# Patient Record
Sex: MALE | Race: WHITE | NOT HISPANIC OR LATINO | Employment: FULL TIME | URBAN - METROPOLITAN AREA
[De-identification: names, ages, dates, MRNs, and addresses within clinical notes are randomized per-mention and may not be internally consistent; named-entity substitution may affect disease eponyms.]

---

## 2019-08-20 ENCOUNTER — OFFICE VISIT (OUTPATIENT)
Dept: PAIN MEDICINE | Facility: CLINIC | Age: 49
End: 2019-08-20

## 2019-08-20 VITALS
RESPIRATION RATE: 18 BRPM | OXYGEN SATURATION: 98 % | HEART RATE: 68 BPM | SYSTOLIC BLOOD PRESSURE: 131 MMHG | HEIGHT: 70 IN | TEMPERATURE: 98.1 F | BODY MASS INDEX: 32.3 KG/M2 | WEIGHT: 225.6 LBS | DIASTOLIC BLOOD PRESSURE: 81 MMHG

## 2019-08-20 DIAGNOSIS — M47.816 LUMBAR FACET ARTHROPATHY: ICD-10-CM

## 2019-08-20 DIAGNOSIS — G89.29 OTHER CHRONIC PAIN: Primary | ICD-10-CM

## 2019-08-20 LAB
POC AMPHETAMINES: NEGATIVE
POC BARBITURATES: NEGATIVE
POC BENZODIAZEPHINES: NEGATIVE
POC COCAINE: NEGATIVE
POC METHADONE: NEGATIVE
POC METHAMPHETAMINE SCREEN URINE: NEGATIVE
POC OPIATES: NEGATIVE
POC OXYCODONE: NEGATIVE
POC PHENCYCLIDINE: NEGATIVE
POC PROPOXYPHENE: NEGATIVE
POC THC: NEGATIVE
POC TRICYCLIC ANTIDEPRESSANTS: NEGATIVE

## 2019-08-20 PROCEDURE — 80305 DRUG TEST PRSMV DIR OPT OBS: CPT | Performed by: NURSE PRACTITIONER

## 2019-08-20 PROCEDURE — 99204 OFFICE O/P NEW MOD 45 MIN: CPT | Performed by: NURSE PRACTITIONER

## 2019-08-20 RX ORDER — FLUTICASONE PROPIONATE 50 MCG
SPRAY, SUSPENSION (ML) NASAL
COMMUNITY
End: 2022-08-24

## 2019-08-20 RX ORDER — MOMETASONE FUROATE 1 MG/G
OINTMENT TOPICAL DAILY
COMMUNITY
Start: 2017-11-29 | End: 2022-08-24

## 2019-08-20 RX ORDER — AZELASTINE HCL 205.5 UG/1
SPRAY NASAL
COMMUNITY
Start: 2019-08-17 | End: 2022-08-24

## 2019-08-20 RX ORDER — LOVASTATIN 40 MG/1
40 TABLET ORAL DAILY
COMMUNITY
Start: 2018-12-11 | End: 2019-12-11

## 2019-08-20 RX ORDER — LORATADINE 10 MG/1
10 TABLET ORAL DAILY
COMMUNITY
Start: 2018-01-25 | End: 2022-08-24

## 2019-08-20 RX ORDER — MELOXICAM 15 MG/1
15 TABLET ORAL DAILY
Qty: 30 TABLET | Refills: 1 | Status: SHIPPED | OUTPATIENT
Start: 2019-08-20 | End: 2019-10-15 | Stop reason: SDUPTHER

## 2019-08-20 NOTE — PROGRESS NOTES
CHIEF COMPLAINT  Mr. Martinez states that his low back pain started about 8 months ago and has progressively gotten worse. He states that his pain is worse at night and when he lays flat on his back.    Subjective   Aldair Martinez is a 48 y.o. male.   He presents to the office for evaluation of back pain. He was referred here by STEVO Keenan (pcp).     Reports back pain started about 8 months ago. Pain has become progressively worse since onset.  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 Recent MRI. Has been referred here to consider interventional options.      Pain is present across the low back.  The pain does not radiate.  Worse with prolonged sitting, laying flat at night.  Improved with nothing in particular. Sleeps in a recliner which helps.  Has not been taking anything OTC.  Tried Gabapentin prescribed by PCP but says they did not help, only tried for 1.5 weeks.      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  Lives at home with his wife.  Denies use of tobacco, alcohol, or drugs. No history of substance abuse.      Back Pain   This is a chronic problem. The current episode started more than 1 year ago. The problem occurs daily. The problem has been gradually worsening since onset. The pain is present in the lumbar spine. The quality of the pain is described as aching and burning. The pain does not radiate. The pain is at a severity of 6/10. The pain is moderate. The symptoms are aggravated by position, sitting and lying down. Pertinent negatives include no numbness or weakness. He has tried home exercises (Gabapentin) for the symptoms. The treatment provided no relief.     PEG Assessment   What number best describes your pain on average in the past week?8  What number best describes how, during the past week, pain has interfered with your enjoyment of life?6  What number best describes how, during the past week, pain has  "interfered with your general activity?  8      Current Outpatient Medications:   •  azelastine (ASTEPRO) 0.15 % solution nasal spray, , Disp: , Rfl:   •  fluticasone (FLONASE) 50 MCG/ACT nasal spray, into the nostril(s) as directed by provider., Disp: , Rfl:   •  loratadine (CLARITIN) 10 MG tablet, Take 10 mg by mouth Daily., Disp: , Rfl:   •  lovastatin (MEVACOR) 40 MG tablet, Take 40 mg by mouth Daily., Disp: , Rfl:   •  mometasone (ELOCON) 0.1 % ointment, Apply  topically to the appropriate area as directed Daily., Disp: , Rfl:   •  meloxicam (MOBIC) 15 MG tablet, Take 1 tablet by mouth Daily., Disp: 30 tablet, Rfl: 1    The following portions of the patient's history were reviewed and updated as appropriate: allergies, current medications, past family history, past medical history, past social history, past surgical history and problem list.    REVIEW OF PERTINENT MEDICAL DATA    Patient was referred to our practice by his primary care provider Purnima WHITESIDE.  I reviewed office note dated 7/3/2019.  Patient complaining of lumbar spine pain.  Pain severity is 8 out of 10.  Ordered an MRI of the lumbar spine.  Gabapentin.  Referral to pain medicine to consider injections.        WEST = 6    Review of Systems   Constitutional: Negative for fatigue.   HENT: Negative for congestion.    Eyes: Negative for visual disturbance.   Respiratory: Negative for cough, shortness of breath and wheezing.    Cardiovascular: Negative.    Gastrointestinal: Negative for constipation and diarrhea.   Genitourinary: Negative for difficulty urinating.   Musculoskeletal: Positive for back pain.   Neurological: Negative for weakness and numbness.   Psychiatric/Behavioral: Positive for sleep disturbance. Negative for suicidal ideas. The patient is not nervous/anxious.      Vitals:    08/20/19 1042   BP: 131/81   Pulse: 68   Resp: 18   Temp: 98.1 °F (36.7 °C)   SpO2: 98%   Weight: 102 kg (225 lb 9.6 oz)   Height: 177.8 cm (70\") "   PainSc:   6   PainLoc: Back     Objective   Physical Exam   Constitutional: He is oriented to person, place, and time. He appears well-developed and well-nourished. He is cooperative. No distress.   HENT:   Head: Normocephalic and atraumatic.   Right Ear: External ear normal.   Left Ear: External ear normal.   Nose: Nose normal.   Eyes: Conjunctivae, EOM and lids are normal. Pupils are equal, round, and reactive to light.   Neck: Trachea normal and normal range of motion. Neck supple.   Cardiovascular: Normal rate, regular rhythm and normal heart sounds.   Pulmonary/Chest: Effort normal and breath sounds normal. No respiratory distress.   Abdominal: Soft. Normal appearance and bowel sounds are normal. He exhibits no distension. There is no tenderness. There is no rebound.   Musculoskeletal: He exhibits no deformity.        Lumbar back: He exhibits decreased range of motion, tenderness, bony tenderness and pain.   +lumbar facet tenderness/loading   Neurological: He is alert and oriented to person, place, and time. He has normal strength and normal reflexes. No cranial nerve deficit or sensory deficit. Coordination and gait normal.   Reflex Scores:       Patellar reflexes are 2+ on the right side and 2+ on the left side.       Achilles reflexes are 2+ on the right side and 2+ on the left side.  Skin: Skin is warm, dry and intact. He is not diaphoretic.   Psychiatric: He has a normal mood and affect. His speech is normal and behavior is normal. Judgment and thought content normal. Cognition and memory are normal.   Nursing note and vitals reviewed.    Assessment/Plan   Aldair was seen today for back pain.    Diagnoses and all orders for this visit:    Other chronic pain    Lumbar facet arthropathy  -     Case Request  -     Urine Drug Screen Confirmation - Urine, Clean Catch; Future  -     POC Urine Drug Screen, Triage    Other orders  -     meloxicam (MOBIC) 15 MG tablet; Take 1 tablet by mouth Daily.      ---  "Bilateral L3-L5 MBB X 2, 2-4 weeks apart.   -------  Education about Medial Branch Blockade and RF Therapy:    This medial branch blockade (MBB) suggested is intended for diagnostic purposes, with the intent of offering the patient Radiofrequency thermal rhizotomy (RF) if the MBB is diagnostically effective.  The diagnostic blockade is necessary to determine the likelihood that RF therapy could be efficacious in providing long term relief to the patient.    Medial branches are sensory nerve branches that connect to a facet joint and transmit sensations & pain signals from that joint.  Facet is a term for the type of joints found in the spine.  Medial branches are the nerves that go to a facet, and therefore are also sometimes called \"facet joint nerves\" (FJNs).      In a medial branch blockade procedure, xray fluoroscopy is used to verify the locations of the outside of the joint lines which are being targeted.  Under xray guidance, needles are placed to these areas.  Contrast dye is injected to confirm proper placement, with dye flowing over the joint area, and to ensure that the dye does not flow into unintended areas such as a vein.  When this is confirmed, local anesthetic is injected to block the medial branch at that joint level.      If MBBs are diagnostically successful in blocking pain, then the patient is most likely a great candidate for Radiofrequency of those facet joint nerves.  In the RF procedure, needles are placed to the joint lines in the same fashion, and after testing, the needle tips are heated to thermally treat the nerves, blocking the nerves by in essence damaging the nerves with the heat treatment.       Medically, a successful RF procedure should provide a patient with 50% pain relief or more for at least 6 months.  Clinical experience suggests that successful patients receive relief more in the range of 12 months on average.  We also discussed that a fortunate minority of patients receive " therapeutic success from the MBB, and may not require RF ablation.  If a patient receives more than 8 weeks of relief from MBB, then occasional repeat MBB for therapeutic purposes is a very reasonable alternative therapy.  This course of therapy is consistent with our LCDs according to our CMS  in the area, and therefore other insurance providers should follow accordingly.  We will monitor our patients to screen for these therapeutic responders and will offer RF therapy only when necessary.      We discussed that MBB & RF are not without risks.  Guidelines regarding anticoagulant use & neuraxial procedures will be respected.  Patients that are ill or otherwise may be at risk for sepsis will not have their spines accessed by neuraxial injections of any type.  This patient will not be offered these therapies if there is an increased risk.   We discussed that there is a risk of postprocedural pain and also a risk of worsening of clinical picture with these procedures as with any neuraxial procedure.    -------    --- Trial Meloxicam 15 mg daily. Discussed medication with the patient.  Included in this discussion was the potential for side effects and adverse events.  Patient verbalized understanding and wished to proceed.  Prescription will be sent to pharmacy.  --- Routine UDS in office today as part of new patient evaluation.  The specimen was viewed and the immunoassay result reviewed and is NEG.  This specimen will be sent to [facility] for confirmation.     --- Follow-up after procedure

## 2019-08-25 ENCOUNTER — RESULTS ENCOUNTER (OUTPATIENT)
Dept: PAIN MEDICINE | Facility: CLINIC | Age: 49
End: 2019-08-25

## 2019-08-25 DIAGNOSIS — M47.816 LUMBAR FACET ARTHROPATHY: ICD-10-CM

## 2019-09-05 DIAGNOSIS — M47.816 LUMBAR FACET ARTHROPATHY: Primary | ICD-10-CM

## 2019-10-15 RX ORDER — MELOXICAM 15 MG/1
15 TABLET ORAL DAILY
Qty: 30 TABLET | Refills: 1 | Status: SHIPPED | OUTPATIENT
Start: 2019-10-15 | End: 2019-11-08 | Stop reason: ALTCHOICE

## 2019-10-15 NOTE — TELEPHONE ENCOUNTER
I can try to order this, however more than likely insurance will again deny it since we will not have updated documentation that he has completed physical therapy. I am happy to do this, but again it may end up with an office visit in the end...

## 2019-10-15 NOTE — TELEPHONE ENCOUNTER
Patient called and stated that he has gone to PT and it is not improving his pain. He would liek to know if you can order the procedure now does he have to come in for a f/u. He stated that e has a $50 co-pay. Please advise.

## 2019-11-08 ENCOUNTER — OFFICE VISIT (OUTPATIENT)
Dept: PAIN MEDICINE | Facility: CLINIC | Age: 49
End: 2019-11-08

## 2019-11-08 ENCOUNTER — PRIOR AUTHORIZATION (OUTPATIENT)
Dept: PAIN MEDICINE | Facility: CLINIC | Age: 49
End: 2019-11-08

## 2019-11-08 VITALS
DIASTOLIC BLOOD PRESSURE: 83 MMHG | BODY MASS INDEX: 33.41 KG/M2 | WEIGHT: 233.4 LBS | HEART RATE: 67 BPM | HEIGHT: 70 IN | OXYGEN SATURATION: 97 % | SYSTOLIC BLOOD PRESSURE: 131 MMHG | TEMPERATURE: 97.1 F | RESPIRATION RATE: 16 BRPM

## 2019-11-08 DIAGNOSIS — G89.29 OTHER CHRONIC PAIN: Primary | ICD-10-CM

## 2019-11-08 DIAGNOSIS — M47.816 LUMBAR FACET ARTHROPATHY: ICD-10-CM

## 2019-11-08 PROCEDURE — 99214 OFFICE O/P EST MOD 30 MIN: CPT | Performed by: NURSE PRACTITIONER

## 2019-11-08 RX ORDER — DICLOFENAC 35 MG/1
1 CAPSULE ORAL EVERY 8 HOURS PRN
Qty: 90 CAPSULE | Refills: 0 | Status: SHIPPED | OUTPATIENT
Start: 2019-11-08 | End: 2020-04-15 | Stop reason: SINTOL

## 2019-11-08 NOTE — TELEPHONE ENCOUNTER
JEFFREY BARTLETT (Key: [id number]) – [id number]  Zorvolex 35MG capsules  Status: Approved  Created: November 8th, 2019 [id number]  Sent: November 8th, 2019

## 2019-11-08 NOTE — PROGRESS NOTES
CHIEF COMPLAINT  F/U back pain- patient states that his pain has remained the same since his last visit. He states that he went to PT and that actually made his pain worse. He states that the Meloxicam improves his pain enough so he can sleep in his bed and not in a chair.     Subjective   Aldair Martinez is a 48 y.o. male  who presents to the office for follow-up.He has chronic back pain (unstable).    Patient reports that his back pain started 10 or 11 months ago.  Pain has become aggressively worse since the onset.  Has recently completed formal physical therapy which actually made his pain worse.  Has also failed conservative options with medications including over-the-counter NSAIDs, over-the-counter Tylenol, gabapentin.  Meloxicam has helped some but still reports significant levels of pain.      Pain is axial and present across the low back.  Denies any radiating symptoms.  Worse with prolonged sitting and standing. Meloxicam helps some, pain still severe.     MRI        Back Pain    This is a chronic problem. The current episode started more than 1 year ago. The problem occurs daily. The problem has been gradually worsening since onset. The pain is present in the lumbar spine. The quality of the pain is described as aching and burning. The pain does not radiate. The pain is at a severity of 6/10. The pain is moderate. The symptoms are aggravated by position, sitting and lying down. Pertinent negatives include no abdominal pain, dysuria, headaches, numbness or weakness. He has tried home exercises (Gabapentin, Meloxicam, PT) for the symptoms. The treatment provided mild relief.     PEG Assessment   What number best describes your pain on average in the past week?7  What number best describes how, during the past week, pain has interfered with your enjoyment of life?5  What number best describes how, during the past week, pain has interfered with your general activity?  5    The following portions of the  "patient's history were reviewed and updated as appropriate: allergies, current medications, past family history, past medical history, past social history, past surgical history and problem list.    Review of Systems   Constitutional: Negative for fatigue.   HENT: Negative for congestion.    Eyes: Negative for visual disturbance.   Respiratory: Positive for shortness of breath. Negative for cough and wheezing.    Cardiovascular: Negative.    Gastrointestinal: Negative for abdominal pain, constipation and diarrhea.   Genitourinary: Negative for difficulty urinating and dysuria.   Musculoskeletal: Positive for back pain.   Neurological: Negative for dizziness, weakness, light-headedness, numbness and headaches.   Psychiatric/Behavioral: Positive for sleep disturbance. Negative for agitation and suicidal ideas. The patient is not nervous/anxious.      Vitals:    11/08/19 1121   BP: 131/83   Pulse: 67   Resp: 16   Temp: 97.1 °F (36.2 °C)   SpO2: 97%   Weight: 106 kg (233 lb 6.4 oz)   Height: 177.8 cm (70\")   PainSc:   6   PainLoc: Back     Objective   Physical Exam   Constitutional: He is oriented to person, place, and time. He appears well-developed and well-nourished. No distress.   HENT:   Head: Normocephalic and atraumatic.   Eyes: Conjunctivae and EOM are normal.   Neck: Neck supple.   Cardiovascular: Normal rate.   Pulmonary/Chest: Effort normal. No respiratory distress.   Musculoskeletal:        Lumbar back: He exhibits tenderness and pain.   +lumbar facet tenderness/loading    Neurological: He is alert and oriented to person, place, and time. He has normal strength. No sensory deficit. Gait normal.   Skin: Skin is warm and dry. He is not diaphoretic.   Psychiatric: He has a normal mood and affect. His behavior is normal.   Nursing note and vitals reviewed.    Assessment/Plan   Aldair was seen today for back pain.    Diagnoses and all orders for this visit:    Other chronic pain    Lumbar facet arthropathy  -     " "Case Request    Other orders  -     Diclofenac (ZORVOLEX) 35 MG capsule; Take 1 capsule by mouth Every 8 (Eight) Hours As Needed (pain).      --- Bilateral L3-L5 MBB X 2, 2-4 weeks apart.   -------  Education about Medial Branch Blockade and RF Therapy:    This medial branch blockade (MBB) suggested is intended for diagnostic purposes, with the intent of offering the patient Radiofrequency thermal rhizotomy (RF) if the MBB is diagnostically effective.  The diagnostic blockade is necessary to determine the likelihood that RF therapy could be efficacious in providing long term relief to the patient.    Medial branches are sensory nerve branches that connect to a facet joint and transmit sensations & pain signals from that joint.  Facet is a term for the type of joints found in the spine.  Medial branches are the nerves that go to a facet, and therefore are also sometimes called \"facet joint nerves\" (FJNs).      In a medial branch blockade procedure, xray fluoroscopy is used to verify the locations of the outside of the joint lines which are being targeted.  Under xray guidance, needles are placed to these areas.  Contrast dye is injected to confirm proper placement, with dye flowing over the joint area, and to ensure that the dye does not flow into unintended areas such as a vein.  When this is confirmed, local anesthetic is injected to block the medial branch at that joint level.      If MBBs are diagnostically successful in blocking pain, then the patient is most likely a great candidate for Radiofrequency of those facet joint nerves.  In the RF procedure, needles are placed to the joint lines in the same fashion, and after testing, the needle tips are heated to thermally treat the nerves, blocking the nerves by in essence damaging the nerves with the heat treatment.       Medically, a successful RF procedure should provide a patient with 50% pain relief or more for at least 6 months.  Clinical experience suggests " that successful patients receive relief more in the range of 12 months on average.  We also discussed that a fortunate minority of patients receive therapeutic success from the MBB, and may not require RF ablation.  If a patient receives more than 8 weeks of relief from MBB, then occasional repeat MBB for therapeutic purposes is a very reasonable alternative therapy.  This course of therapy is consistent with our LCDs according to our CMS  in the area, and therefore other insurance providers should follow accordingly.  We will monitor our patients to screen for these therapeutic responders and will offer RF therapy only when necessary.      We discussed that MBB & RF are not without risks.  Guidelines regarding anticoagulant use & neuraxial procedures will be respected.  Patients that are ill or otherwise may be at risk for sepsis will not have their spines accessed by neuraxial injections of any type.  This patient will not be offered these therapies if there is an increased risk.   We discussed that there is a risk of postprocedural pain and also a risk of worsening of clinical picture with these procedures as with any neuraxial procedure.    -------    --- Trial a different NSAID - trial zorvolex. Discussed medication with the patient.  Included in this discussion was the potential for side effects and adverse events.  Patient verbalized understanding and wished to proceed.  Prescription will be sent to pharmacy.  --- Follow-up after procedure          RAMY REPORT  RAMY report has been reviewed and scanned into the patient's chart.    As the clinician, I personally reviewed the RAMY from 11/8/19 while the patient was in the office today.    EMR Dragon/Transcription disclaimer:   Much of this encounter note is an electronic transcription/translation of spoken language to printed text. The electronic translation of spoken language may permit erroneous, or at times, nonsensical words or phrases to  be inadvertently transcribed; Although I have reviewed the note for such errors, some may still exist.

## 2019-12-12 ENCOUNTER — OUTSIDE FACILITY SERVICE (OUTPATIENT)
Dept: PAIN MEDICINE | Facility: CLINIC | Age: 49
End: 2019-12-12

## 2019-12-12 ENCOUNTER — DOCUMENTATION (OUTPATIENT)
Dept: PAIN MEDICINE | Facility: CLINIC | Age: 49
End: 2019-12-12

## 2019-12-12 PROCEDURE — 64494 INJ PARAVERT F JNT L/S 2 LEV: CPT | Performed by: ANESTHESIOLOGY

## 2019-12-12 PROCEDURE — 64493 INJ PARAVERT F JNT L/S 1 LEV: CPT | Performed by: ANESTHESIOLOGY

## 2019-12-16 NOTE — PROGRESS NOTES
Bilateral L3-5 Lumbar Medial Branch Blockade  [facility]      PREOPERATIVE DIAGNOSIS:  Lumbar spondylosis without myelopathy    POSTOPERATIVE DIAGNOSIS:  Lumbar spondylosis without myelopathy    PROCEDURE:   Diagnostic Bilateral Lumbar Medial Branch Nerve Blockades, with fluoroscopy:  L3, L4, and L5 nerves (at the L4 & L5 transverse processes and the sacral alar groove) to block facet joints L4-5, and L5-S1  1. 03717-67 -- Bilateral Lumbar Facet blocks, 1st Level  2. 45138-56 -- Bilateral Lumbar Facet blocks, 2nd  Level    PRE-PROCEDURE DISCUSSION WITH PATIENT:    Risks and complications were discussed with the patient prior to starting the procedure and informed consent was obtained.      SURGEON:  Onesimo Patino MD    REASON FOR PROCEDURE:    The patient complains of pain that seems to have a significant axial component    SEDATION:  Patient declined administration of moderate sedation    ANESTHETIC:  Marcaine 0.5%  STEROID:  Methylprednisolone (DEPO MEDROL) 60mg  TOTAL VOLUME OF SOLUTION:  6ml    DESCRIPTON OF PROCEDURE:  After obtaining informed consent, IV access was not obtained in the preoperative area.   The patient was taken to the operating room.  The patient was placed in the prone position with a pillow under the abdomen. All pressure points were well padded.  EKG, blood pressure, and pulse oximeter were monitored.  The patient was monitored and sedated by the RN under my direction. The lumbosacral area was prepped with Chloraprep and draped in a sterile fashion. Under fluoroscopic guidance the transverse processes of the L4 and L5 vertebrae at the junctions of the superior articular processes were identified on the right. Also identified was the groove between the ala and the superior articular process of the sacrum on the ipsilateral side.  Skin and subcutaneous tissue were anesthetized with 1% lidocaine above each of these points. A 22-gauge spinal needle was introduced under  fluoroscopic guidance at the above junctions. Aspiration was negative for blood and CSF.  After confirming the position of the needle with fluoroscope in all views, 0.25 mL of Omnipaque was injected, and after seeing the proper spread a total of 1 mL of the anesthetic solution noted above was injected at each of these points.  Needles were removed intact from each of the areas.  A similar procedure was repeated to block the L3, L4, and L5 nerves on the contralateral side.   Onset of analgesia was noted.  Vital signs remained stable throughout.      ESTIMATED BLOOD LOSS:  <5 mL  SPECIMENS:  none    COMPLICATIONS:   No complications were noted., There was no indication of vascular uptake on live injection of contrast dye. and The patient did not have any signs of postprocedure numbness nor weakness.    TOLERANCE & DISCHARGE CONDITION:    The patient tolerated the procedure well.  The patient was transported to the recovery area without difficulties.  The patient was discharged to home under the care of family in stable and satisfactory condition.    PLAN OF CARE:  1. The patient was given our standard instruction sheet.  2. We discussed that Lumbar Medial Branch Blockade is a diagnostic procedure in consideration for radiofrequency ablation if two diagnostic procedures prove to be positive for significant benefit.  If sustained relief of 6 to eight weeks is obtained, then an alternative plan could be therapeutic lumbar branch blockades.  3. The patient is asked to keep a pain log each hour for 8 hours after the procedure today.  4. The patient will  Repeat injection 2-4 wks.  5. The patient will resume all medications as per the medication reconciliation sheet.

## 2020-01-08 RX ORDER — MELOXICAM 15 MG/1
TABLET ORAL
COMMUNITY
Start: 2019-11-20 | End: 2020-01-08 | Stop reason: SDUPTHER

## 2020-01-08 RX ORDER — MELOXICAM 15 MG/1
15 TABLET ORAL DAILY
Qty: 30 TABLET | Refills: 2 | Status: SHIPPED | OUTPATIENT
Start: 2020-01-08 | End: 2020-04-15

## 2020-01-08 RX ORDER — MELOXICAM 15 MG/1
TABLET ORAL
COMMUNITY
Start: 2019-11-20 | End: 2020-10-28 | Stop reason: SDUPTHER

## 2020-01-08 NOTE — TELEPHONE ENCOUNTER
Medication Refill Request    Date of phone call: 20    Medication being requested: meloxicam 15mg si tablet by mouth Daily  Qty: 30    Date of last visit: 19    Date of last refill: 10/15/19    RAMY up to date?: yes    Next Follow up?: 1/15/20    Any new pertinent information? (i.e, new medication allergies, new use of medications, change in patient's health or condition, non-compliance or inconsistency with prescribing agreement?): it looks like this med was d/c in 10/2019, can you please reorder? Spoke with pt today, he would like to have med ordered again. Thank you.

## 2020-01-15 ENCOUNTER — DOCUMENTATION (OUTPATIENT)
Dept: PAIN MEDICINE | Facility: CLINIC | Age: 50
End: 2020-01-15

## 2020-01-15 ENCOUNTER — OUTSIDE FACILITY SERVICE (OUTPATIENT)
Dept: PAIN MEDICINE | Facility: CLINIC | Age: 50
End: 2020-01-15

## 2020-01-15 PROCEDURE — 64494 INJ PARAVERT F JNT L/S 2 LEV: CPT | Performed by: ANESTHESIOLOGY

## 2020-01-15 PROCEDURE — 64493 INJ PARAVERT F JNT L/S 1 LEV: CPT | Performed by: ANESTHESIOLOGY

## 2020-01-15 NOTE — PROGRESS NOTES
Bilateral L3-5 Lumbar Medial Branch Blockade  [facility]      PREOPERATIVE DIAGNOSIS:  Lumbar spondylosis without myelopathy    POSTOPERATIVE DIAGNOSIS:  Lumbar spondylosis without myelopathy    PROCEDURE:   Diagnostic Bilateral Lumbar Medial Branch Nerve Blockades, with fluoroscopy:  L3, L4, and L5 nerves (at the L4 & L5 transverse processes and the sacral alar groove) to block facet joints L4-5, and L5-S1  1. 05117-27 -- Bilateral Lumbar Facet blocks, 1st Level  2. 79818-89 -- Bilateral Lumbar Facet blocks, 2nd  Level    PRE-PROCEDURE DISCUSSION WITH PATIENT:    Risks and complications were discussed with the patient prior to starting the procedure and informed consent was obtained.      SURGEON:  Onesimo Patino MD    REASON FOR PROCEDURE:    The patient complains of pain that seems to have a significant axial component, Previous diagnostic positivity of a Lumbar Medial Branch Blockade at the same levels and The last blockade was helpful with 90+ % relief diagnostically.    SEDATION:  Patient declined administration of moderate sedation    ANESTHETIC:  Marcaine 0.5%  STEROID:  Methylprednisolone (DEPO MEDROL) 60mg  TOTAL VOLUME OF SOLUTION:  6ml    DESCRIPTON OF PROCEDURE:  After obtaining informed consent, IV access was not obtained in the preoperative area.   The patient was taken to the operating room.  The patient was placed in the prone position with a pillow under the abdomen. All pressure points were well padded.  EKG, blood pressure, and pulse oximeter were monitored.  The patient was monitored and sedated by the RN under my direction. The lumbosacral area was prepped with Chloraprep and draped in a sterile fashion. Under fluoroscopic guidance the transverse processes of the L4 and L5 vertebrae at the junctions of the superior articular processes were identified on the right. Also identified was the groove between the ala and the superior articular process of the sacrum on the  ipsilateral side.  Skin and subcutaneous tissue were anesthetized with 1% lidocaine above each of these points. A 22-gauge spinal needle was introduced under fluoroscopic guidance at the above junctions. Aspiration was negative for blood and CSF.  After confirming the position of the needle with fluoroscope in all views, 0.25 mL of Omnipaque was injected, and after seeing the proper spread a total of 1 mL of the anesthetic solution noted above was injected at each of these points.  Needles were removed intact from each of the areas.  A similar procedure was repeated to block the L3, L4, and L5 nerves on the contralateral side.   Onset of analgesia was noted.  Vital signs remained stable throughout.      ESTIMATED BLOOD LOSS:  <5 mL  SPECIMENS:  none    COMPLICATIONS:   No complications were noted., There was no indication of vascular uptake on live injection of contrast dye. and The patient did not have any signs of postprocedure numbness nor weakness.    TOLERANCE & DISCHARGE CONDITION:    The patient tolerated the procedure well.  The patient was transported to the recovery area without difficulties.  The patient was discharged to home under the care of family in stable and satisfactory condition.    PLAN OF CARE:  1. The patient was given our standard instruction sheet.  2. We discussed that Lumbar Medial Branch Blockade is a diagnostic procedure in consideration for radiofrequency ablation if two diagnostic procedures prove to be positive for significant benefit.  If sustained relief of 6 to eight weeks is obtained, then an alternative plan could be therapeutic lumbar branch blockades.  3. The patient is asked to keep a pain log each hour for 8 hours after the procedure today.  4. The patient will  Return to clinic 6 wks.  5. The patient will resume all medications as per the medication reconciliation sheet.

## 2020-02-28 ENCOUNTER — OFFICE VISIT (OUTPATIENT)
Dept: PAIN MEDICINE | Facility: CLINIC | Age: 50
End: 2020-02-28

## 2020-02-28 VITALS
WEIGHT: 238.6 LBS | RESPIRATION RATE: 17 BRPM | BODY MASS INDEX: 34.16 KG/M2 | TEMPERATURE: 97.7 F | SYSTOLIC BLOOD PRESSURE: 138 MMHG | HEART RATE: 68 BPM | HEIGHT: 70 IN | DIASTOLIC BLOOD PRESSURE: 87 MMHG | OXYGEN SATURATION: 94 %

## 2020-02-28 DIAGNOSIS — M47.816 LUMBAR FACET ARTHROPATHY: Primary | ICD-10-CM

## 2020-02-28 DIAGNOSIS — G89.29 OTHER CHRONIC PAIN: ICD-10-CM

## 2020-02-28 PROCEDURE — 99214 OFFICE O/P EST MOD 30 MIN: CPT | Performed by: NURSE PRACTITIONER

## 2020-02-28 RX ORDER — CEFADROXIL 500 MG/1
CAPSULE ORAL
Status: ON HOLD | COMMUNITY
Start: 2020-02-25 | End: 2021-08-12

## 2020-02-28 NOTE — PROGRESS NOTES
CHIEF COMPLAINT  F/U back pain-Bilateral L3-5 Lumbar Medial Branch Blockade- patient states that his pain has been improved 80% for 1 month.      Initial evaluation by STEVO Willis   Aldair Martinez is a 49 y.o. male  who presents to the office for follow-up of procedure.  He completed a bilateral L3-L5 MBB  on 12/12/19 and 1/15/20 performed by Dr. Patino for management of low back pain. Patient reports 80% diagnostic pain relief from the first procedure and 80% relief from the second procedure x 1 month. Today his pain is 2/10VAS in severity.  The patient would like to proceed with the radiofrequency ablation.    Back Pain   This is a chronic problem. The current episode started more than 1 year ago. The problem occurs daily. The problem has been gradually worsening since onset. The pain is present in the lumbar spine. The quality of the pain is described as aching and burning. The pain does not radiate. The pain is at a severity of 2/10. The pain is moderate. The symptoms are aggravated by position, sitting and lying down. Pertinent negatives include no abdominal pain, dysuria, headaches, numbness or weakness. He has tried home exercises (Gabapentin, Meloxicam, PT, MBB) for the symptoms. The treatment provided significant relief.      PEG Assessment   What number best describes your pain on average in the past week?1  What number best describes how, during the past week, pain has interfered with your enjoyment of life?0  What number best describes how, during the past week, pain has interfered with your general activity?  0    The following portions of the patient's history were reviewed and updated as appropriate: allergies, current medications, past family history, past medical history, past social history, past surgical history and problem list.    Review of Systems   Constitutional: Negative for fatigue.   HENT: Negative for congestion.    Eyes: Negative for visual disturbance.  "  Respiratory: Negative for cough, shortness of breath and wheezing.    Cardiovascular: Negative.    Gastrointestinal: Negative for abdominal pain, constipation and diarrhea.   Genitourinary: Negative for difficulty urinating and dysuria.   Musculoskeletal: Positive for back pain.   Neurological: Negative for dizziness, weakness, light-headedness, numbness and headaches.   Psychiatric/Behavioral: Negative for agitation, sleep disturbance and suicidal ideas. The patient is not nervous/anxious.      Vitals:    02/28/20 1240   BP: 138/87   Pulse: 68   Resp: 17   Temp: 97.7 °F (36.5 °C)   SpO2: 94%   Weight: 108 kg (238 lb 9.6 oz)   Height: 177.8 cm (70\")   PainSc:   2   PainLoc: Back     Objective   Physical Exam   Constitutional: He is oriented to person, place, and time. He appears well-developed and well-nourished.   HENT:   Head: Normocephalic and atraumatic.   Eyes: Conjunctivae and EOM are normal.   Neck: Normal range of motion.   Cardiovascular: Normal rate.   Pulmonary/Chest: Effort normal.   Musculoskeletal:        Lumbar back: He exhibits decreased range of motion, tenderness and pain.   POS Lumbar Loading Maneuver     Neurological: He is alert and oriented to person, place, and time. Gait normal.   Skin: Skin is warm, dry and intact.   Psychiatric: He has a normal mood and affect. His speech is normal and behavior is normal. Judgment and thought content normal.   Nursing note and vitals reviewed.    Assessment/Plan   Aldair was seen today for back pain.    Diagnoses and all orders for this visit:    Lumbar facet arthropathy    Other chronic pain    --- Bilateral L3-L5 RFL  --------  Education about Radiofrequency Lesioning of Medial Branches:    The medial branch blockade was intended for diagnostic purposes, with the intent of offering the patient Radiofrequency thermal rhizotomy if the MBB is diagnostically effective.  The diagnostic blockade is necessary to determine the likelihood that RF therapy could be " efficacious in providing long term relief to the patient.  As indicated above, diagnostic efficacy was established.      In the RF procedure, needles are placed to the joint lines in the same fashion, and after testing, the needle tips are heated to thermally treat the nerves, blocking the nerves by in essence damaging the nerves with the heat treatment.      Medically, a successful RF procedure should provide a patient with 50% pain relief or more for at least 6 months.  We estimate a likelihood of about an 80% chance that medical success will be realized.  We discussed & educated once again that not all patients have a medically successful result, and the patient voices understanding.  However, our clinical experience suggests that successful patients receive relief more in the range of 12 months on average.  (We also discussed that a fortunate minority of patients receive therapeutic success from the MBB, and may not require RF ablation.  If a patient receives more than 8 weeks of relief from MBB, then occasional repeat MBB for therapeutic purposes is a very reasonable alternative therapy.  This course of therapy is consistent with our LCDs according to our CMS  in the area, and therefore other insurance providers should follow accordingly.  We will monitor our patients to screen for these therapeutic responders and will offer RF therapy only when necessary.  However, in this clinical scenario, this therapeutic result was not realized, and therefore Radiofrequency Lesioning is medically necessary.)      We discussed that MBB & RF are not without risks.  Guidelines regarding anticoagulant use & neuraxial procedures will be respected.  Patients that are ill or otherwise may be at risk for sepsis will not have their spines accessed by neuraxial injections of any type.  This patient will not be offered these therapies if there is an increased risk.   We discussed that there is a risk of postprocedural  pain and also a risk of worsening of clinical picture with these procedures as with any neuraxial procedure.  All invasive procedures have risks including but not limited to bleeding, infection, injury, nerve injury, paralysis, coma, death, lack of pain relief, and worsening of clinical picture.      In this education session, all of these topics were covered and the patient voiced understanding.    ---------  --- Follow-up after procedure     RAMY REPORT  RAMY report has been reviewed and scanned into the patient's chart.    As the clinician, I personally reviewed the RAMY from 2/27/2020 while the patient was in the office today.    EMR Dragon/Transcription disclaimer:   Much of this encounter note is an electronic transcription/translation of spoken language to printed text. The electronic translation of spoken language may permit erroneous, or at times, nonsensical words or phrases to be inadvertently transcribed; Although I have reviewed the note for such errors, some may still exist.

## 2020-03-03 ENCOUNTER — TELEPHONE (OUTPATIENT)
Dept: PAIN MEDICINE | Facility: CLINIC | Age: 50
End: 2020-03-03

## 2020-04-15 RX ORDER — MELOXICAM 15 MG/1
TABLET ORAL
Qty: 30 TABLET | Refills: 1 | Status: SHIPPED | OUTPATIENT
Start: 2020-04-15 | End: 2020-08-14

## 2020-04-15 NOTE — TELEPHONE ENCOUNTER
Patient states that he stopped the diclofenac because it was upsetting his stomach so he went back on the meloxicam.

## 2020-05-29 ENCOUNTER — TELEPHONE (OUTPATIENT)
Dept: PAIN MEDICINE | Facility: CLINIC | Age: 50
End: 2020-05-29

## 2020-05-29 ENCOUNTER — LAB REQUISITION (OUTPATIENT)
Dept: LAB | Facility: HOSPITAL | Age: 50
End: 2020-05-29

## 2020-05-29 DIAGNOSIS — Z00.00 ENCOUNTER FOR GENERAL ADULT MEDICAL EXAMINATION WITHOUT ABNORMAL FINDINGS: ICD-10-CM

## 2020-05-29 PROCEDURE — U0004 COV-19 TEST NON-CDC HGH THRU: HCPCS | Performed by: ANESTHESIOLOGY

## 2020-05-29 NOTE — TELEPHONE ENCOUNTER
PATIENT HAD AUTH FOR RF AUTH  ON 2020 PHONED INSURANCE WAS TOLD I WOULD HAVE TO RESUBMITT ON LINE RESUMITTED AUTH

## 2020-05-30 LAB
REF LAB TEST METHOD: NORMAL
SARS-COV-2 RNA RESP QL NAA+PROBE: NOT DETECTED

## 2020-06-01 ENCOUNTER — OUTSIDE FACILITY SERVICE (OUTPATIENT)
Dept: PAIN MEDICINE | Facility: CLINIC | Age: 50
End: 2020-06-01

## 2020-06-01 ENCOUNTER — DOCUMENTATION (OUTPATIENT)
Dept: PAIN MEDICINE | Facility: CLINIC | Age: 50
End: 2020-06-01

## 2020-06-01 PROCEDURE — 64494 INJ PARAVERT F JNT L/S 2 LEV: CPT | Performed by: ANESTHESIOLOGY

## 2020-06-01 PROCEDURE — 64493 INJ PARAVERT F JNT L/S 1 LEV: CPT | Performed by: ANESTHESIOLOGY

## 2020-06-01 PROCEDURE — 99152 MOD SED SAME PHYS/QHP 5/>YRS: CPT | Performed by: ANESTHESIOLOGY

## 2020-06-01 NOTE — PROGRESS NOTES
Bilateral L3-5 Lumbar Medial Branch RADIOFREQUENCY  [facility]      PREOPERATIVE DIAGNOSIS:  Lumbar spondylosis without myelopathy    POSTOPERATIVE DIAGNOSIS:  Lumbar spondylosis without myelopathy    PROCEDURE:   Diagnostic Bilateral Lumbar Medial Branch Nerve thermal radiofrequency lesioning, with fluoroscopy:  L3, L4, and L5 nerves (at the L4 and L5 transverse processes and the sacral alar groove) to thermally treat the innervation to facet joints L4-5 and L5-S1  1. 75344-51 -- Bilateral L/S facet neuro destr., 1st Level  2. [id number] -- Bilateral L/S facet neuro destr., 2nd  Level    PRE-PROCEDURE DISCUSSION WITH PATIENT:    Risks and complications were discussed with the patient prior to starting the procedure and informed consent was obtained.      SURGEON:  Onesimo Patino MD    REASON FOR PROCEDURE:    The patient complains of pain that seems to have a significant axial component and Previous diagnostic positivity of two Lumbar Medial Branch Blockades at the same levels    SEDATION:  Fentanyl 50 mcg IV and Ketorolac 30 mg IV  TIME OF PROCEDURE:   The intraoperative procedure time after administration of the sedative was 20 minutes.     ANESTHETIC:  Lidocaine 2%  STEROID:  NONE      DESCRIPTON OF PROCEDURE:  After obtaining informed consent, IV access  was obtained in the preoperative area.   The patient was taken to the operating room.  The patient was placed in the prone position with a pillow under the abdomen. All pressure points were well padded.  EKG, blood pressure, and pulse oximeter were monitored.  The patient was monitored and sedated by the RN under my direction. The lumbosacral area was prepped with Chloraprep and draped in a sterile fashion.     Under fluoroscopic guidance the transverse processes of the L4 and L5 vertebrae at the junctions of the superior articular processes were identified on the right.  Also identified was the groove between the ala and the superior  articular process of the sacrum on the ipsilateral side.  Skin and subcutaneous tissue were anesthetized with 1ml of 1% lidocaine above each of these points. Then, radiofrequency probe needles were advanced in this fluoro view to the above junctions.  Aspiration was negative for blood and CSF.  After confirming the position of the needle with fluoroscope in all views, testing was initiated.  First, sensory testing was started on each needle a 1V and 50Hz and slowly decreased until painful pressure stimulation diminished at 0.5V.  Next, motor testing was confirmed to be negative at 3V and 2Hz for any radicular stimulation.  Then 1mL of the local anesthetic was instilled in each needle.  Two minutes elapsed, and during this time a lateral fluoroscopic view was confirmed again to ensure the needles had not advanced nor retracted.  Then, Radiofrequency Lesioning was initiated for 3 minutes at 85 degrees Celsius.  Needles were removed intact from each of the areas.     A similar procedure was repeated to address the L3, L4, and L5 nerves on the contralateral side.   Onset of analgesia was noted.  Vital signs remained stable throughout.      ESTIMATED BLOOD LOSS:  <5 mL  SPECIMENS:  none    COMPLICATIONS:   No complications were noted. and The patient did not have any signs of postprocedure numbness nor weakness.    TOLERANCE & DISCHARGE CONDITION:    The patient tolerated the procedure well.  The patient was transported to the recovery area without difficulties.  The patient was discharged to home under the care of family in stable and satisfactory condition.    PLAN OF CARE:  1. The patient was given our standard instruction sheet.  2. The patient will  Return to clinic 6 wks.  3. The patient will resume all medications as per the medication reconciliation sheet.

## 2020-07-16 ENCOUNTER — OFFICE VISIT (OUTPATIENT)
Dept: PAIN MEDICINE | Facility: CLINIC | Age: 50
End: 2020-07-16

## 2020-07-16 DIAGNOSIS — M47.816 LUMBAR FACET ARTHROPATHY: Primary | ICD-10-CM

## 2020-07-16 DIAGNOSIS — G89.29 OTHER CHRONIC PAIN: ICD-10-CM

## 2020-07-16 PROCEDURE — 99441 PR PHYS/QHP TELEPHONE EVALUATION 5-10 MIN: CPT | Performed by: NURSE PRACTITIONER

## 2020-07-16 NOTE — PROGRESS NOTES
TELEPHONE VISIT    You have chosen to receive care through a telephone visit. Do you consent to use a telephone visit for your medical care today? Yes      CHIEF COMPLAINT: F/u back pain    Subjective   Aldair Martinez is a 49 y.o. male  who presents for a telephonic follow-up.He has a history of low back pain. He completed a Bilateral L3-L5 Radiofrequency lesioning on 6/1/2020 performed by Dr. Patino.  He reports 75-80% ONGOING relief from this procedure.  Today his pain is 1/10VAS in severity.  He continues to utilize meloxicam which he states helps decrease his back pain.  He denies any side effects with this medication.      We discussed that RFL can be repeated every 6 months as needed and that we will need to repeat one set of medial branch blocks prior to proceeding with repeat radiofrequency ablation.  Patient states understanding.     Back Pain   This is a chronic problem. The current episode started more than 1 year ago. The problem occurs daily. The problem has been gradually worsening since onset. The pain is present in the lumbar spine. The quality of the pain is described as aching and burning. The pain does not radiate. The pain is at a severity of 1/10. The pain is moderate. The symptoms are aggravated by position, sitting and lying down. Pertinent negatives include no abdominal pain, chest pain, dysuria, fever, headaches, numbness or weakness. He has tried home exercises ( Meloxicam, PT, MBB) for the symptoms. The treatment provided significant (RFL) relief.      The following portions of the patient's history were reviewed and updated as appropriate: allergies, current medications, past family history, past medical history, past social history, past surgical history and problem list.    Review of Systems   Constitutional: Negative for chills and fever.   HENT: Negative for congestion and sore throat.    Respiratory: Negative for cough and shortness of breath.    Cardiovascular: Negative for chest  pain and palpitations.   Gastrointestinal: Negative for abdominal pain.   Genitourinary: Negative for dysuria.   Musculoskeletal: Positive for back pain.   Skin: Negative for rash and wound.   Neurological: Negative for weakness, numbness and headaches.   Psychiatric/Behavioral: Negative for confusion and suicidal ideas. The patient is not nervous/anxious.      Vitals:    07/16/20 1309   PainSc:   1     Objective   Physical Exam  As this is a telephone check-in, the ability to perform a routine physical exam is extremely limited. The patient seems alert and is oriented appropriately.   On this phone call there is not any evidence of respiratory distress.   The patient seems of normal mood.   The remainder of a routine physical exam is deferred.    Assessment/Plan   Diagnoses and all orders for this visit:    Lumbar facet arthropathy    Other chronic pain      ----------------    Our practice is offering alternative &/or electronic methods to continue to follow our patients while at the same time further the efforts toward social distancing, in accordance with our organizational policies, professional societies' guidance, and gubernatorial mandates.  I support the Healthy at Home campaign and in this visit I have counseled the patient on our needs to limit in-person office visits and physical encounters with medical facilities whenever possible.  I have also educated the patient on the medical necessities of maintaining social distancing while we continue to function during this crisis period.      ----------------    --- This visit has been rescheduled as a phone visit to comply with patient safety concerns in accordance with CDC recommendations. Total time of discussion was 5 minutes.  --- Follow-up if pain returns or worsens     RAMY REPORT    RAMY report has been reviewed and scanned into the patient's chart.    As the clinician, I personally reviewed the RAMY from 7/15/2020 while the patient was on the  telephonic visit today.    -------    EMR Dragon/Transcription disclaimer:   Much of this encounter note is an electronic transcription/translation of spoken language to printed text. The electronic translation of spoken language may permit erroneous, or at times, nonsensical words or phrases to be inadvertently transcribed; Although I have reviewed the note for such errors, some may still exist.

## 2020-08-14 RX ORDER — MELOXICAM 15 MG/1
TABLET ORAL
Qty: 30 TABLET | Refills: 1 | Status: SHIPPED | OUTPATIENT
Start: 2020-08-14 | End: 2020-10-28 | Stop reason: SDUPTHER

## 2020-10-09 ENCOUNTER — OFFICE VISIT (OUTPATIENT)
Dept: PAIN MEDICINE | Facility: CLINIC | Age: 50
End: 2020-10-09

## 2020-10-09 VITALS
HEART RATE: 62 BPM | HEIGHT: 70 IN | OXYGEN SATURATION: 97 % | WEIGHT: 232 LBS | TEMPERATURE: 97.1 F | RESPIRATION RATE: 20 BRPM | BODY MASS INDEX: 33.21 KG/M2

## 2020-10-09 DIAGNOSIS — M54.50 CHRONIC BILATERAL LOW BACK PAIN WITHOUT SCIATICA: ICD-10-CM

## 2020-10-09 DIAGNOSIS — M47.816 LUMBAR FACET ARTHROPATHY: Primary | ICD-10-CM

## 2020-10-09 DIAGNOSIS — G89.29 CHRONIC BILATERAL LOW BACK PAIN WITHOUT SCIATICA: ICD-10-CM

## 2020-10-09 DIAGNOSIS — G89.29 OTHER CHRONIC PAIN: ICD-10-CM

## 2020-10-09 PROCEDURE — 99213 OFFICE O/P EST LOW 20 MIN: CPT | Performed by: NURSE PRACTITIONER

## 2020-10-09 RX ORDER — LOVASTATIN 40 MG/1
TABLET ORAL
Status: ON HOLD | COMMUNITY
Start: 2020-09-22 | End: 2021-08-12

## 2020-10-09 NOTE — PROGRESS NOTES
CHIEF COMPLAINT  F/u back pain. Pt sts pain has worsened recently.     Subjective   Aldair Martinez is a 49 y.o. male  who presents for follow-up.  He has a history of back pain.  Today his pain is 6/10VAS in severity.  He completed a Bilateral L3-L5 Radiofrequency ablation on 6/1/2020 with 75-80% relief with this procedure.  He states that he has ONGOING 50% relief from this procedure but states that he does feel that his pain is starting to worsen with prolonged standing, prolonged driving, and prolonged supine position.  He sates he would like to start the process of repeating the RFL.  Discussed that the RFL can not be repeated prior to 6 months from his previous ablation, patient states understanding.     Patient remained masked during entire encounter. No cough present. I donned a mask and eye protection throughout entire visit. Prior to donning mask and eye protection, hand hygiene was performed, as well as when it was doffed.  I was closer than 6 feet, but not for an extended period of time. No obvious exposure to any bodily fluids.    Back Pain  This is a chronic problem. The current episode started more than 1 year ago. The problem occurs daily. The problem has been gradually worsening since onset. The pain is present in the lumbar spine. The quality of the pain is described as aching and burning. The pain does not radiate. The pain is at a severity of 6/10. The pain is moderate. The symptoms are aggravated by position, sitting and lying down. Pertinent negatives include no abdominal pain, chest pain, dysuria, fever, headaches, numbness or weakness. He has tried home exercises ( Meloxicam, PT, MBB) for the symptoms. The treatment provided significant (RFL) relief.      PEG Assessment   What number best describes your pain on average in the past week?6  What number best describes how, during the past week, pain has interfered with your enjoyment of life?6  What number best describes how, during the past  "week, pain has interfered with your general activity?  6    The following portions of the patient's history were reviewed and updated as appropriate: allergies, current medications, past family history, past medical history, past social history, past surgical history and problem list.    Review of Systems   Constitutional: Negative for activity change, fatigue, fever and unexpected weight change.   HENT: Negative for congestion.    Eyes: Negative for visual disturbance.   Respiratory: Negative for cough and shortness of breath.    Cardiovascular: Negative for chest pain.   Gastrointestinal: Negative for abdominal pain, constipation and diarrhea.   Endocrine: Negative for polyuria.   Genitourinary: Negative for difficulty urinating and dysuria.   Musculoskeletal: Positive for back pain. Negative for arthralgias.   Allergic/Immunologic: Negative for immunocompromised state.   Neurological: Negative for dizziness, weakness, light-headedness, numbness and headaches.   Psychiatric/Behavioral: Positive for sleep disturbance. Negative for agitation and suicidal ideas. The patient is not nervous/anxious.      --  The aforementioned information the Chief Complaint section and above subjective data including any HPI data, and also the Review of Systems data, has been personally reviewed and affirmed.  --    Vitals:    10/09/20 1029   Pulse: 62   Resp: 20   Temp: 97.1 °F (36.2 °C)   SpO2: 97%   Weight: 105 kg (232 lb)   Height: 177.8 cm (70\")   PainSc:   6   PainLoc: Back     Objective   Physical Exam  Vitals signs and nursing note reviewed.   Constitutional:       Appearance: Normal appearance. He is well-developed.   HENT:      Head: Normocephalic and atraumatic.   Eyes:      General: Lids are normal.      Conjunctiva/sclera: Conjunctivae normal.   Neck:      Musculoskeletal: Normal range of motion.      Trachea: Trachea normal.   Cardiovascular:      Rate and Rhythm: Normal rate.   Pulmonary:      Effort: Pulmonary effort " is normal.   Musculoskeletal:      Lumbar back: He exhibits decreased range of motion and tenderness.      Comments: POS Lumbar Loading Maneuver   Skin:     General: Skin is warm and dry.   Neurological:      Mental Status: He is alert and oriented to person, place, and time.   Psychiatric:         Speech: Speech normal.         Behavior: Behavior normal.         Judgment: Judgment normal.       Assessment/Plan    Aldair was seen today for back pain.    Diagnoses and all orders for this visit:    Lumbar facet arthropathy  -     Case Request    Other chronic pain    Chronic bilateral low back pain without sciatica      --- Repeat Bilateral L3-L5 medial branch blockade   Reviewed the procedure at length with the patient.  Included in the review was expectations, complications, risk and benefits.The procedure was described in detail and the risks, benefits and alternatives were discussed with the patient (including but not limited to: bleeding, infection, nerve damage, worsening of pain, no pain relief, inability to perform injection, paralysis, seizures, and death) who agreed to proceed.  Discussed the potential for sedation if warranted/wanted.  The procedure will plan to be performed at [facility] with fluoroscopic guidance(unless ultrasound is indicated) and could potentially have steroids and contrast dye used. Questions were answered and in a way the patient could understand.  Patient verbalized understanding and wishes to proceed.  This intervention will be ordered.  Discussed with patient that all procedures are part of a multimodal plan of care and include either formal PT or a home exercise program.  Patient has no evidence of coagulopathy or current infection.  --- Follow-up after procedure.      RAMY REPORT    RAMY report has been reviewed within Epic.    As the clinician, I personally reviewed the RAMY from 10/9/2020 while the patient was in the office today.    EMR  Dragon/Transcription disclaimer:   Much of this encounter note is an electronic transcription/translation of spoken language to printed text. The electronic translation of spoken language may permit erroneous, or at times, nonsensical words or phrases to be inadvertently transcribed; Although I have reviewed the note for such errors, some may still exist.

## 2020-10-28 DIAGNOSIS — Z79.1 NSAID LONG-TERM USE: Primary | ICD-10-CM

## 2020-10-28 RX ORDER — MELOXICAM 15 MG/1
TABLET ORAL
Qty: 30 TABLET | Refills: 0 | Status: SHIPPED | OUTPATIENT
Start: 2020-10-28 | End: 2020-12-21

## 2020-10-28 NOTE — TELEPHONE ENCOUNTER
Will refill. Per NSAID protocol need updated lab work.  I will order a H&H and ast/alt. Please inform patient of these lab orders.

## 2020-10-30 ENCOUNTER — LAB REQUISITION (OUTPATIENT)
Dept: LAB | Facility: HOSPITAL | Age: 50
End: 2020-10-30

## 2020-10-30 DIAGNOSIS — Z00.00 ENCOUNTER FOR GENERAL ADULT MEDICAL EXAMINATION WITHOUT ABNORMAL FINDINGS: ICD-10-CM

## 2020-10-31 PROCEDURE — U0004 COV-19 TEST NON-CDC HGH THRU: HCPCS | Performed by: ANESTHESIOLOGY

## 2020-11-02 LAB — SARS-COV-2 RNA RESP QL NAA+PROBE: NOT DETECTED

## 2020-11-03 ENCOUNTER — DOCUMENTATION (OUTPATIENT)
Dept: PAIN MEDICINE | Facility: CLINIC | Age: 50
End: 2020-11-03

## 2020-11-03 ENCOUNTER — OUTSIDE FACILITY SERVICE (OUTPATIENT)
Dept: PAIN MEDICINE | Facility: CLINIC | Age: 50
End: 2020-11-03

## 2020-11-03 PROCEDURE — 64493 INJ PARAVERT F JNT L/S 1 LEV: CPT | Performed by: ANESTHESIOLOGY

## 2020-11-03 PROCEDURE — 64494 INJ PARAVERT F JNT L/S 2 LEV: CPT | Performed by: ANESTHESIOLOGY

## 2020-11-03 NOTE — PROGRESS NOTES
Bilateral L3-5 Lumbar Medial Branch Blockade  [facility]      PREOPERATIVE DIAGNOSIS:  Lumbar spondylosis without myelopathy    POSTOPERATIVE DIAGNOSIS:  Lumbar spondylosis without myelopathy    PROCEDURE:   Diagnostic Bilateral Lumbar Medial Branch Nerve Blockades, with fluoroscopy:  L3, L4, and L5 nerves (at the L4 & L5 transverse processes and the sacral alar groove) to block facet joints L4-5, and L5-S1  1. 86670-57 -- Bilateral Lumbar Facet blocks, 1st Level  2. 88768-89 -- Bilateral Lumbar Facet blocks, 2nd  Level    PRE-PROCEDURE DISCUSSION WITH PATIENT:    Risks and complications were discussed with the patient prior to starting the procedure and informed consent was obtained.      SURGEON:  Onesimo Patino MD    REASON FOR PROCEDURE:    The patient complains of pain that seems to have a significant axial component, Previous diagnostic positivity of two Lumbar Medial Branch Blockades at the same levels and Previous clinically significant therapeutic effect after prior Radiofrequency procedure    SEDATION:  Patient declined administration of moderate sedation    ANESTHETIC:  Marcaine 0.5%  STEROID:  Methylprednisolone (DEPO MEDROL) 60mg  TOTAL VOLUME OF SOLUTION:  6ml    DESCRIPTON OF PROCEDURE:  After obtaining informed consent, IV access was not obtained in the preoperative area.   The patient was taken to the operating room.  The patient was placed in the prone position with a pillow under the abdomen. All pressure points were well padded.  EKG, blood pressure, and pulse oximeter were monitored.  The patient was monitored and sedated by the RN under my direction. The lumbosacral area was prepped with Chloraprep and draped in a sterile fashion. Under fluoroscopic guidance the transverse processes of the L4 and L5 vertebrae at the junctions of the superior articular processes were identified on the right. Also identified was the groove between the ala and the superior articular process  of the sacrum on the ipsilateral side.  Skin and subcutaneous tissue were anesthetized with 1% lidocaine above each of these points. A 22-gauge spinal needle was introduced under fluoroscopic guidance at the above junctions. Aspiration was negative for blood and CSF.  After confirming the position of the needle with fluoroscope in all views, 0.25 mL of Omnipaque was injected, and after seeing the proper spread a total of 1 mL of the anesthetic solution noted above was injected at each of these points.  Needles were removed intact from each of the areas.  A similar procedure was repeated to block the L3, L4, and L5 nerves on the contralateral side.   Onset of analgesia was noted.  Vital signs remained stable throughout.      ESTIMATED BLOOD LOSS:  <5 mL  SPECIMENS:  none    COMPLICATIONS:   No complications were noted., There was no indication of vascular uptake on live injection of contrast dye. and The patient did not have any signs of postprocedure numbness nor weakness.    TOLERANCE & DISCHARGE CONDITION:    The patient tolerated the procedure well.  The patient was transported to the recovery area without difficulties.  The patient was discharged to home under the care of family in stable and satisfactory condition.    PLAN OF CARE:  1. The patient was given our standard instruction sheet.  2. We discussed that Lumbar Medial Branch Blockade is a diagnostic procedure in consideration for radiofrequency ablation if two diagnostic procedures prove to be positive for significant benefit.  If sustained relief of 6 to eight weeks is obtained, then an alternative plan could be therapeutic lumbar branch blockades.  3. The patient is asked to keep a pain log each hour for 8 hours after the procedure today.  4. The patient will  Return to clinic 4-6 wks.  5. The patient will resume all medications as per the medication reconciliation sheet.

## 2020-12-04 ENCOUNTER — OFFICE VISIT (OUTPATIENT)
Dept: PAIN MEDICINE | Facility: CLINIC | Age: 50
End: 2020-12-04

## 2020-12-04 VITALS
HEART RATE: 72 BPM | HEIGHT: 70 IN | DIASTOLIC BLOOD PRESSURE: 78 MMHG | RESPIRATION RATE: 20 BRPM | OXYGEN SATURATION: 97 % | SYSTOLIC BLOOD PRESSURE: 133 MMHG | BODY MASS INDEX: 34.1 KG/M2 | TEMPERATURE: 97.5 F | WEIGHT: 238.2 LBS

## 2020-12-04 DIAGNOSIS — G89.29 CHRONIC BILATERAL LOW BACK PAIN WITHOUT SCIATICA: ICD-10-CM

## 2020-12-04 DIAGNOSIS — G89.29 OTHER CHRONIC PAIN: ICD-10-CM

## 2020-12-04 DIAGNOSIS — M47.816 LUMBAR FACET ARTHROPATHY: Primary | ICD-10-CM

## 2020-12-04 DIAGNOSIS — M54.50 CHRONIC BILATERAL LOW BACK PAIN WITHOUT SCIATICA: ICD-10-CM

## 2020-12-04 PROCEDURE — 99213 OFFICE O/P EST LOW 20 MIN: CPT | Performed by: NURSE PRACTITIONER

## 2020-12-04 RX ORDER — AZELASTINE HCL 205.5 UG/1
SPRAY NASAL
COMMUNITY
Start: 2020-09-22 | End: 2022-08-24

## 2020-12-04 RX ORDER — EPINEPHRINE 0.3 MG/.3ML
INJECTION SUBCUTANEOUS
COMMUNITY
Start: 2020-11-23 | End: 2022-08-24

## 2020-12-04 RX ORDER — MOMETASONE FUROATE 1 MG/G
OINTMENT TOPICAL DAILY
COMMUNITY
Start: 2020-11-23 | End: 2022-08-24

## 2020-12-04 RX ORDER — LOVASTATIN 40 MG/1
40 TABLET ORAL DAILY
COMMUNITY
Start: 2020-11-23 | End: 2021-11-23

## 2020-12-04 NOTE — PROGRESS NOTES
CHIEF COMPLAINT  F/u back pain. Pt had Bilateral L3-5 Lumbar Medial Branch Blockade    Subjective   Aldair Martinez is a 49 y.o. male  who presents for follow-up.  He has a history of back pain.  He completed a Bilateral L3-L5 medial branch blockade on 11/3/2020 performed by Dr. Patino. He reports 80-85% relief from this procedure.      Today his pain is 3/10VAS in severity. He describes his pain as aching pain that radiates across his low back in a band-like fashion. His pain is worsened by position, prolonged sitting, and lying down. His pain is improved with RFL and medication. He continues with meloxicam 15 mg daily.  He denies any side effects with this medication.     Patient remained masked during entire encounter. No cough present. I donned a mask and eye protection throughout entire visit. Prior to donning mask and eye protection, hand hygiene was performed, as well as when it was doffed.  I was closer than 6 feet, but not for an extended period of time. No obvious exposure to any bodily fluids.    Back Pain  This is a chronic problem. The current episode started more than 1 year ago. The problem occurs daily. The problem has been gradually worsening since onset. The pain is present in the lumbar spine. The quality of the pain is described as aching and burning. The pain does not radiate. The pain is at a severity of 3/10. The pain is moderate. The symptoms are aggravated by position, sitting and lying down. Pertinent negatives include no abdominal pain, chest pain, dysuria, fever, headaches, numbness or weakness. He has tried home exercises ( Meloxicam, PT, MBB) for the symptoms. The treatment provided significant (RFL) relief.      PEG Assessment   What number best describes your pain on average in the past week?3  What number best describes how, during the past week, pain has interfered with your enjoyment of life?3  What number best describes how, during the past week, pain has interfered with your  "general activity?  3    The following portions of the patient's history were reviewed and updated as appropriate: allergies, current medications, past family history, past medical history, past social history, past surgical history and problem list.    Review of Systems   Constitutional: Negative for activity change, fatigue and fever.   HENT: Negative for congestion.    Eyes: Negative for visual disturbance.   Respiratory: Negative for cough and shortness of breath.    Cardiovascular: Negative for chest pain.   Gastrointestinal: Negative for abdominal pain, constipation and diarrhea.   Genitourinary: Negative for difficulty urinating and dysuria.   Musculoskeletal: Positive for back pain.   Allergic/Immunologic: Negative for immunocompromised state.   Neurological: Negative for dizziness, weakness, light-headedness, numbness and headaches.   Psychiatric/Behavioral: Negative for agitation, sleep disturbance and suicidal ideas. The patient is not nervous/anxious.      --  The aforementioned information the Chief Complaint section and above subjective data including any HPI data, and also the Review of Systems data, has been personally reviewed and affirmed.  --    Vitals:    12/04/20 1059   BP: 133/78   Pulse: 72   Resp: 20   Temp: 97.5 °F (36.4 °C)   SpO2: 97%   Weight: 108 kg (238 lb 3.2 oz)   Height: 177.8 cm (70\")   PainSc:   3   PainLoc: Back     Objective   Physical Exam  Vitals signs and nursing note reviewed.   Constitutional:       Appearance: Normal appearance. He is well-developed.   HENT:      Head: Normocephalic and atraumatic.   Eyes:      General: Lids are normal.      Conjunctiva/sclera: Conjunctivae normal.   Neck:      Musculoskeletal: Normal range of motion.      Trachea: Trachea normal.   Cardiovascular:      Rate and Rhythm: Normal rate.   Pulmonary:      Effort: Pulmonary effort is normal.   Musculoskeletal:      Lumbar back: He exhibits decreased range of motion and tenderness.      Comments: " POS lumbar loading maneuver   Skin:     General: Skin is warm and dry.   Neurological:      Mental Status: He is alert and oriented to person, place, and time.   Psychiatric:         Speech: Speech normal.         Behavior: Behavior normal.         Judgment: Judgment normal.       Assessment/Plan   Diagnoses and all orders for this visit:    1. Lumbar facet arthropathy (Primary)  -     Case Request    2. Chronic bilateral low back pain without sciatica  -     Case Request    3. Other chronic pain      --- Bilateral L3-L5 radiofrequency lesioning  --------  Education about Radiofrequency Lesioning of Medial Branches:    The medial branch blockade was intended for diagnostic purposes, with the intent of offering the patient Radiofrequency thermal rhizotomy if the MBB is diagnostically effective.  The diagnostic blockade is necessary to determine the likelihood that RF therapy could be efficacious in providing long term relief to the patient.  As indicated above, diagnostic efficacy was established.      In the RF procedure, needles are placed to the joint lines in the same fashion, and after testing, the needle tips are heated to thermally treat the nerves, blocking the nerves by in essence damaging the nerves with the heat treatment.      Medically, a successful RF procedure should provide a patient with 50% pain relief or more for at least 6 months.  We estimate a likelihood of about an 80% chance that medical success will be realized.  We discussed & educated once again that not all patients have a medically successful result, and the patient voices understanding.  However, our clinical experience suggests that successful patients receive relief more in the range of 12 months on average.  (We also discussed that a fortunate minority of patients receive therapeutic success from the MBB, and may not require RF ablation.  If a patient receives more than 8 weeks of relief from MBB, then occasional repeat MBB for  therapeutic purposes is a very reasonable alternative therapy.  This course of therapy is consistent with our LCDs according to our CMS  in the area, and therefore other insurance providers should follow accordingly.  We will monitor our patients to screen for these therapeutic responders and will offer RF therapy only when necessary.  However, in this clinical scenario, this therapeutic result was not realized, and therefore Radiofrequency Lesioning is medically necessary.)      We discussed that MBB & RF are not without risks.  Guidelines regarding anticoagulant use & neuraxial procedures will be respected.  Patients that are ill or otherwise may be at risk for sepsis will not have their spines accessed by neuraxial injections of any type.  This patient will not be offered these therapies if there is an increased risk.   We discussed that there is a risk of postprocedural pain and also a risk of worsening of clinical picture with these procedures as with any neuraxial procedure.  All invasive procedures have risks including but not limited to bleeding, infection, injury, nerve injury, paralysis, coma, death, lack of pain relief, and worsening of clinical picture.      In this education session, all of these topics were covered and the patient voiced understanding.    ---------  --- Follow-up after procedure     RAMY REPORT  RAMY report has been reviewed and scanned into the patient's chart.    As the clinician, I personally reviewed the RAMY from 10/9/2020 while the patient was in the office today.    EMR Dragon/Transcription disclaimer:   Much of this encounter note is an electronic transcription/translation of spoken language to printed text. The electronic translation of spoken language may permit erroneous, or at times, nonsensical words or phrases to be inadvertently transcribed; Although I have reviewed the note for such errors, some may still exist.

## 2020-12-21 RX ORDER — MELOXICAM 15 MG/1
TABLET ORAL
Qty: 30 TABLET | Refills: 0 | Status: SHIPPED | OUTPATIENT
Start: 2020-12-21 | End: 2021-08-09

## 2020-12-28 ENCOUNTER — LAB REQUISITION (OUTPATIENT)
Dept: LAB | Facility: HOSPITAL | Age: 50
End: 2020-12-28

## 2020-12-28 DIAGNOSIS — Z00.00 ENCOUNTER FOR GENERAL ADULT MEDICAL EXAMINATION WITHOUT ABNORMAL FINDINGS: ICD-10-CM

## 2020-12-29 PROCEDURE — U0004 COV-19 TEST NON-CDC HGH THRU: HCPCS | Performed by: ANESTHESIOLOGY

## 2020-12-30 LAB — SARS-COV-2 RNA RESP QL NAA+PROBE: NOT DETECTED

## 2020-12-31 ENCOUNTER — DOCUMENTATION (OUTPATIENT)
Dept: PAIN MEDICINE | Facility: CLINIC | Age: 50
End: 2020-12-31

## 2020-12-31 ENCOUNTER — OUTSIDE FACILITY SERVICE (OUTPATIENT)
Dept: PAIN MEDICINE | Facility: CLINIC | Age: 50
End: 2020-12-31

## 2020-12-31 PROCEDURE — 64635 DESTROY LUMB/SAC FACET JNT: CPT | Performed by: ANESTHESIOLOGY

## 2020-12-31 PROCEDURE — 64636 DESTROY L/S FACET JNT ADDL: CPT | Performed by: ANESTHESIOLOGY

## 2020-12-31 NOTE — PROGRESS NOTES
Bilateral L3-5 Lumbar Medial Branch RADIOFREQUENCY  [facility]      PREOPERATIVE DIAGNOSIS:  Lumbar spondylosis without myelopathy    POSTOPERATIVE DIAGNOSIS:  Lumbar spondylosis without myelopathy    PROCEDURE:   Diagnostic Bilateral Lumbar Medial Branch Nerve thermal radiofrequency lesioning, with fluoroscopy:  L3, L4, and L5 nerves (at the L4 and L5 transverse processes and the sacral alar groove) to thermally treat the innervation to facet joints L4-5 and L5-S1  1. 49668-57 -- Bilateral L/S facet neuro destr., 1st Level  2. 83081-49 -- Bilateral L/S facet neuro destr., 2nd  Level    PRE-PROCEDURE DISCUSSION WITH PATIENT:    Risks and complications were discussed with the patient prior to starting the procedure and informed consent was obtained.      SURGEON:  Onesimo Patino MD    REASON FOR PROCEDURE:    The patient complains of pain that seems to have a significant axial component and Previous clinically significant therapeutic effect after prior Radiofrequency procedure    SEDATION:  Fentanyl 50 mcg IV and Ketorolac 30 mg IV  TIME OF PROCEDURE:   The intraoperative procedure time after administration of the sedative was 19 minutes.     ANESTHETIC:  Lidocaine 2%  STEROID:  NONE      DESCRIPTON OF PROCEDURE:  After obtaining informed consent, IV access  was obtained in the preoperative area.   The patient was taken to the operating room.  The patient was placed in the prone position with a pillow under the abdomen. All pressure points were well padded.  EKG, blood pressure, and pulse oximeter were monitored.  The patient was monitored and sedated by the RN under my direction. The lumbosacral area was prepped with Chloraprep and draped in a sterile fashion.     Under fluoroscopic guidance the transverse processes of the L4 and L5 vertebrae at the junctions of the superior articular processes were identified on the right.  Also identified was the groove between the ala and the superior  articular process of the sacrum on the ipsilateral side.  Skin and subcutaneous tissue were anesthetized with 1ml of 1% lidocaine above each of these points. Then, radiofrequency probe needles were advanced in this fluoro view to the above junctions.  Aspiration was negative for blood and CSF.  After confirming the position of the needle with fluoroscope in all views, testing was initiated.  First, sensory testing was started on each needle a 1V and 50Hz and slowly decreased until painful pressure stimulation diminished at 0.5V.  Next, motor testing was confirmed to be negative at 3V and 2Hz for any radicular stimulation.  Then 1mL of the local anesthetic was instilled in each needle.  Two minutes elapsed, and during this time a lateral fluoroscopic view was confirmed again to ensure the needles had not advanced nor retracted.  Then, Radiofrequency Lesioning was initiated for 2.5 minutes at 85 degrees Celsius.  Needles were removed intact from each of the areas.     A similar procedure was repeated to address the L3, L4, and L5 nerves on the contralateral side.   Onset of analgesia was noted.  Vital signs remained stable throughout.      ESTIMATED BLOOD LOSS:  <5 mL  SPECIMENS:  none    COMPLICATIONS:   No complications were noted. and The patient did not have any signs of postprocedure numbness nor weakness.    TOLERANCE & DISCHARGE CONDITION:    The patient tolerated the procedure well.  The patient was transported to the recovery area without difficulties.  The patient was discharged to home under the care of family in stable and satisfactory condition.    PLAN OF CARE:  1. The patient was given our standard instruction sheet.  2. The patient will  Return to clinic 6 wks.  3. The patient will resume all medications as per the medication reconciliation sheet.

## 2021-02-01 DIAGNOSIS — Z79.1 LONG TERM (CURRENT) USE OF NON-STEROIDAL ANTI-INFLAMMATORIES (NSAID): Primary | ICD-10-CM

## 2021-02-26 ENCOUNTER — LAB (OUTPATIENT)
Dept: LAB | Facility: HOSPITAL | Age: 51
End: 2021-02-26

## 2021-02-26 ENCOUNTER — OFFICE VISIT (OUTPATIENT)
Dept: PAIN MEDICINE | Facility: CLINIC | Age: 51
End: 2021-02-26

## 2021-02-26 VITALS
TEMPERATURE: 98.1 F | SYSTOLIC BLOOD PRESSURE: 138 MMHG | OXYGEN SATURATION: 96 % | RESPIRATION RATE: 18 BRPM | HEART RATE: 73 BPM | HEIGHT: 70 IN | WEIGHT: 235 LBS | BODY MASS INDEX: 33.64 KG/M2 | DIASTOLIC BLOOD PRESSURE: 84 MMHG

## 2021-02-26 DIAGNOSIS — M47.816 LUMBAR FACET ARTHROPATHY: ICD-10-CM

## 2021-02-26 DIAGNOSIS — G89.29 CHRONIC BILATERAL LOW BACK PAIN WITHOUT SCIATICA: Primary | ICD-10-CM

## 2021-02-26 DIAGNOSIS — Z79.1 LONG TERM (CURRENT) USE OF NON-STEROIDAL ANTI-INFLAMMATORIES (NSAID): ICD-10-CM

## 2021-02-26 DIAGNOSIS — G89.29 OTHER CHRONIC PAIN: ICD-10-CM

## 2021-02-26 DIAGNOSIS — M54.50 CHRONIC BILATERAL LOW BACK PAIN WITHOUT SCIATICA: Primary | ICD-10-CM

## 2021-02-26 LAB
ANION GAP SERPL CALCULATED.3IONS-SCNC: 9.2 MMOL/L (ref 5–15)
BUN SERPL-MCNC: 16 MG/DL (ref 6–20)
BUN/CREAT SERPL: 12.8 (ref 7–25)
CALCIUM SPEC-SCNC: 9.4 MG/DL (ref 8.6–10.5)
CHLORIDE SERPL-SCNC: 104 MMOL/L (ref 98–107)
CO2 SERPL-SCNC: 24.8 MMOL/L (ref 22–29)
CREAT SERPL-MCNC: 1.25 MG/DL (ref 0.76–1.27)
GFR SERPL CREATININE-BSD FRML MDRD: 61 ML/MIN/1.73
GLUCOSE SERPL-MCNC: 104 MG/DL (ref 65–99)
POTASSIUM SERPL-SCNC: 4.1 MMOL/L (ref 3.5–5.2)
SODIUM SERPL-SCNC: 138 MMOL/L (ref 136–145)

## 2021-02-26 PROCEDURE — 36415 COLL VENOUS BLD VENIPUNCTURE: CPT

## 2021-02-26 PROCEDURE — 80048 BASIC METABOLIC PNL TOTAL CA: CPT

## 2021-02-26 PROCEDURE — 99212 OFFICE O/P EST SF 10 MIN: CPT | Performed by: NURSE PRACTITIONER

## 2021-02-26 NOTE — PROGRESS NOTES
CHIEF COMPLAINT  Back pain has decreased since last visit    Subjective   Aldair Martinez is a 50 y.o. male  who presents to the office for follow-up of procedure.  He completed a Bilateral L3-5 Lumbar Medial Branch RADIOFREQUENCY   on  12/31/20 performed by Dr. Patino for management of back pain. Patient reports 60-70% ONGOING relief from the procedure. Today his pain is 2/10VAS in severity.     He stopped meloxicam 3 weeks ago and had a BMP checked today.  His potassium decreased from 5.2 in November to 4.1 today.  Discussed with patient that at this time I recommend that we keep him off of NSAIDs.  Discussed that he can use OTC tylenol for any increased pain.  Do not exceed 3 grams in 24 hours. Patient states understanding.     Patient remained masked during entire encounter. No cough present. I donned a mask and eye protection throughout entire visit. Prior to donning mask and eye protection, hand hygiene was performed, as well as when it was doffed.  I was closer than 6 feet, but not for an extended period of time. No obvious exposure to any bodily fluids.    Back Pain  This is a chronic problem. The current episode started more than 1 year ago. The problem occurs daily. The problem has been gradually worsening since onset. The pain is present in the lumbar spine. The quality of the pain is described as aching and burning. The pain does not radiate. The pain is at a severity of 2/10. The pain is moderate. The symptoms are aggravated by position, sitting and lying down. Pertinent negatives include no abdominal pain, chest pain, dysuria, fever, headaches, numbness or weakness. He has tried home exercises (PT, RFL) for the symptoms. The treatment provided significant (RFL) relief.      PEG Assessment   What number best describes your pain on average in the past week?3  What number best describes how, during the past week, pain has interfered with your enjoyment of life?3  What number best describes how, during  "the past week, pain has interfered with your general activity?  2    The following portions of the patient's history were reviewed and updated as appropriate: allergies, current medications, past family history, past medical history, past social history, past surgical history and problem list.    Review of Systems   Constitutional: Negative for chills and fever.   Respiratory: Negative for shortness of breath.    Cardiovascular: Negative for chest pain.   Gastrointestinal: Negative for abdominal pain, constipation, diarrhea, nausea and vomiting.   Genitourinary: Negative for difficulty urinating, dysuria and enuresis.   Musculoskeletal: Positive for back pain.   Neurological: Negative for dizziness, weakness, light-headedness, numbness and headaches.   Psychiatric/Behavioral: Negative for confusion, hallucinations, self-injury, sleep disturbance and suicidal ideas. The patient is not nervous/anxious.    All other systems reviewed and are negative.    --  The aforementioned information the Chief Complaint section and above subjective data including any HPI data, and also the Review of Systems data, has been personally reviewed and affirmed.  --     Vitals:    02/26/21 1250   BP: 138/84   Pulse: 73   Resp: 18   Temp: 98.1 °F (36.7 °C)   SpO2: 96%   Weight: 107 kg (235 lb)   Height: 177.8 cm (70\")   PainSc:   3   PainLoc: Back     Objective   Physical Exam  Vitals signs and nursing note reviewed.   Constitutional:       Appearance: Normal appearance. He is well-developed.   Eyes:      General: Lids are normal.   Neck:      Musculoskeletal: Normal range of motion.   Cardiovascular:      Rate and Rhythm: Normal rate.   Pulmonary:      Effort: Pulmonary effort is normal.   Musculoskeletal:      Lumbar back: He exhibits decreased range of motion.   Neurological:      Mental Status: He is alert and oriented to person, place, and time.   Psychiatric:         Attention and Perception: Attention normal.         Mood and " Affect: Mood normal.         Speech: Speech normal.         Behavior: Behavior normal.         Judgment: Judgment normal.       Assessment/Plan   Diagnoses and all orders for this visit:    1. Chronic bilateral low back pain without sciatica (Primary)    2. Lumbar facet arthropathy    3. Other chronic pain      --- May repeat Lumbar RFL every 6 months PRN, patient to call and schedule office visit when pain returns/worsens  --- OTC tylenol for pain, do not exceed 3 grams in 24 hours.   --- Follow-up if pain returns/worsens.      RAMY REPORT    RAMY report has been reviewed and scanned into the patient's chart.    As the clinician, I personally reviewed the RAMY from 2/26/2021 while the patient was in the office today.    EMR Dragon/Transcription disclaimer:   Much of this encounter note is an electronic transcription/translation of spoken language to printed text. The electronic translation of spoken language may permit erroneous, or at times, nonsensical words or phrases to be inadvertently transcribed; Although I have reviewed the note for such errors, some may still exist.

## 2021-07-02 ENCOUNTER — TELEPHONE (OUTPATIENT)
Dept: PAIN MEDICINE | Facility: CLINIC | Age: 51
End: 2021-07-02

## 2021-07-02 NOTE — TELEPHONE ENCOUNTER
Caller: JEFFREY BARTLETT    Relationship to patient: SELF    Best call back number:[phone]    Chief complaint: LOWER LUMBER    Type of visit: ELECTRO FREQUENCY    Requested date: FIRST AVAIL    If rescheduling, when is the original appointment:     Additional notes:

## 2021-07-28 ENCOUNTER — OFFICE VISIT (OUTPATIENT)
Dept: PAIN MEDICINE | Facility: CLINIC | Age: 51
End: 2021-07-28

## 2021-07-28 ENCOUNTER — PREP FOR SURGERY (OUTPATIENT)
Dept: SURGERY | Facility: SURGERY CENTER | Age: 51
End: 2021-07-28

## 2021-07-28 VITALS
OXYGEN SATURATION: 97 % | TEMPERATURE: 97.8 F | RESPIRATION RATE: 20 BRPM | HEART RATE: 61 BPM | DIASTOLIC BLOOD PRESSURE: 83 MMHG | BODY MASS INDEX: 32.78 KG/M2 | SYSTOLIC BLOOD PRESSURE: 136 MMHG | HEIGHT: 70 IN | WEIGHT: 229 LBS

## 2021-07-28 DIAGNOSIS — M47.816 LUMBAR FACET ARTHROPATHY: ICD-10-CM

## 2021-07-28 DIAGNOSIS — M54.50 CHRONIC BILATERAL LOW BACK PAIN WITHOUT SCIATICA: Primary | ICD-10-CM

## 2021-07-28 DIAGNOSIS — G89.29 OTHER CHRONIC PAIN: ICD-10-CM

## 2021-07-28 DIAGNOSIS — G89.29 CHRONIC BILATERAL LOW BACK PAIN WITHOUT SCIATICA: Primary | ICD-10-CM

## 2021-07-28 DIAGNOSIS — M47.816 LUMBAR FACET ARTHROPATHY: Primary | ICD-10-CM

## 2021-07-28 PROCEDURE — 99214 OFFICE O/P EST MOD 30 MIN: CPT | Performed by: NURSE PRACTITIONER

## 2021-07-28 RX ORDER — SODIUM CHLORIDE 0.9 % (FLUSH) 0.9 %
10 SYRINGE (ML) INJECTION AS NEEDED
Status: CANCELLED | OUTPATIENT
Start: 2021-07-28

## 2021-07-28 RX ORDER — ALBUTEROL SULFATE 90 UG/1
AEROSOL, METERED RESPIRATORY (INHALATION)
COMMUNITY
Start: 2021-05-04 | End: 2022-08-24

## 2021-07-28 RX ORDER — SODIUM CHLORIDE 0.9 % (FLUSH) 0.9 %
10 SYRINGE (ML) INJECTION EVERY 12 HOURS SCHEDULED
Status: CANCELLED | OUTPATIENT
Start: 2021-07-28

## 2021-07-28 NOTE — PROGRESS NOTES
"CHIEF COMPLAINT  F/u back pain. Pt sts pain has worsened since last ov. Pt would like to discuss injections.     Subjective   Aldair Martinez is a 50 y.o. male  who presents for follow-up.  He has a history of back pain. Patient was last seen by myself on 2/26/2021.  He completed a bilateral L3-L5 lumbar medial branch radiofrequency ablation on 12/31/2020 with reported 60 to 70% ongoing relief from that procedure.  Discussed that he could repeat lumbar radiofrequency lesioning every 6 months as needed with plans for him to follow-up in office if his pain returns/worsened.    Today his pain is 6/10VAS in severity. His pain is in his low back and he describes this as \"crushing\" pain.  This pain radiates in a band-like fashion across his low back.  His pain is worsened with sitting, standing, and lying.  His pain is improved with walking and changing position.     Patient remained masked during entire encounter. No cough present. I donned a mask and eye protection throughout entire visit. Prior to donning mask and eye protection, hand hygiene was performed, as well as when it was doffed.  I was closer than 6 feet, but not for an extended period of time. No obvious exposure to any bodily fluids.    Back Pain  This is a chronic problem. The current episode started more than 1 year ago. The problem occurs daily. The problem has been gradually worsening since onset. The pain is present in the lumbar spine. The quality of the pain is described as aching and burning. The pain does not radiate. The pain is at a severity of 6/10. The pain is moderate. The symptoms are aggravated by position, sitting and lying down. Pertinent negatives include no abdominal pain, chest pain, dysuria, fever, headaches, numbness or weakness. He has tried home exercises (PT, RFL) for the symptoms. The treatment provided significant (RFL) relief.      PEG Assessment   What number best describes your pain on average in the past week?6  What number " "best describes how, during the past week, pain has interfered with your enjoyment of life?6  What number best describes how, during the past week, pain has interfered with your general activity?  6    The following portions of the patient's history were reviewed and updated as appropriate: allergies, current medications, past family history, past medical history, past social history, past surgical history and problem list.    Review of Systems   Constitutional: Negative for activity change, fatigue and fever.   HENT: Negative for congestion.    Eyes: Negative for visual disturbance.   Respiratory: Negative for cough and shortness of breath.    Cardiovascular: Negative for chest pain.   Gastrointestinal: Negative for abdominal pain, constipation and diarrhea.   Endocrine: Negative for polyuria.   Genitourinary: Negative for difficulty urinating and dysuria.   Musculoskeletal: Positive for back pain.   Neurological: Negative for dizziness, weakness, light-headedness, numbness and headaches.   Psychiatric/Behavioral: Negative for agitation, sleep disturbance and suicidal ideas. The patient is not nervous/anxious.      --  The aforementioned information the Chief Complaint section and above subjective data including any HPI data, and also the Review of Systems data, has been personally reviewed and affirmed.  --    Vitals:    07/28/21 1133   BP: 136/83   Pulse: 61   Resp: 20   Temp: 97.8 °F (36.6 °C)   SpO2: 97%   Weight: 104 kg (229 lb)   Height: 177.8 cm (70\")   PainSc:   6   PainLoc: Back     Objective   Physical Exam  Vitals and nursing note reviewed.   Constitutional:       Appearance: Normal appearance. He is well-developed.   Eyes:      General: Lids are normal.   Cardiovascular:      Rate and Rhythm: Normal rate.   Pulmonary:      Effort: Pulmonary effort is normal.   Musculoskeletal:      Cervical back: Normal range of motion.      Lumbar back: Tenderness and bony tenderness present. Decreased range of motion. "      Comments: POS Lumbar loading maneuver   Neurological:      Mental Status: He is alert and oriented to person, place, and time.      Gait: Gait normal.   Psychiatric:         Attention and Perception: Attention normal.         Mood and Affect: Mood normal.         Speech: Speech normal.         Behavior: Behavior normal.         Judgment: Judgment normal.       Assessment/Plan   Diagnoses and all orders for this visit:    1. Chronic bilateral low back pain without sciatica (Primary)    2. Lumbar facet arthropathy    3. Other chronic pain      --- Repeat Bilateral L3-L5 RFA  --------  Education about Radiofrequency Lesioning of Medial Branches:    The medial branch blockade was intended for diagnostic purposes, with the intent of offering the patient Radiofrequency thermal rhizotomy if the MBB is diagnostically effective.  The diagnostic blockade is necessary to determine the likelihood that RF therapy could be efficacious in providing long term relief to the patient.  As indicated above, diagnostic efficacy was established.      In the RF procedure, needles are placed to the joint lines in the same fashion, and after testing, the needle tips are heated to thermally treat the nerves, blocking the nerves by in essence damaging the nerves with the heat treatment.      Medically, a successful RF procedure should provide a patient with 50% pain relief or more for at least 6 months.  We estimate a likelihood of about an 80% chance that medical success will be realized.  We discussed & educated once again that not all patients have a medically successful result, and the patient voices understanding.  However, our clinical experience suggests that successful patients receive relief more in the range of 12 months on average.  (We also discussed that a fortunate minority of patients receive therapeutic success from the MBB, and may not require RF ablation.  If a patient receives more than 8 weeks of relief from MBB, then  occasional repeat MBB for therapeutic purposes is a very reasonable alternative therapy.  This course of therapy is consistent with our LCDs according to our CMS  in the area, and therefore other insurance providers should follow accordingly.  We will monitor our patients to screen for these therapeutic responders and will offer RF therapy only when necessary.  However, in this clinical scenario, this therapeutic result was not realized, and therefore Radiofrequency Lesioning is medically necessary.)      We discussed that MBB & RF are not without risks.  Guidelines regarding anticoagulant use & neuraxial procedures will be respected.  Patients that are ill or otherwise may be at risk for sepsis will not have their spines accessed by neuraxial injections of any type.  This patient will not be offered these therapies if there is an increased risk.   We discussed that there is a risk of postprocedural pain and also a risk of worsening of clinical picture with these procedures as with any neuraxial procedure.  All invasive procedures have risks including but not limited to bleeding, infection, injury, nerve injury, paralysis, coma, death, lack of pain relief, and worsening of clinical picture.      In this education session, all of these topics were covered and the patient voiced understanding.    ---------    --- Follow-up after procedure     RAMY REPORT    As the clinician, I personally reviewed the RAMY from 7/28/2021 while the patient was in the office today.     Dictated utilizing Dragon dictation.

## 2021-07-29 ENCOUNTER — TRANSCRIBE ORDERS (OUTPATIENT)
Dept: SURGERY | Facility: SURGERY CENTER | Age: 51
End: 2021-07-29

## 2021-07-29 DIAGNOSIS — M47.816 LUMBAR FACET ARTHROPATHY: Primary | ICD-10-CM

## 2021-08-09 ENCOUNTER — TRANSCRIBE ORDERS (OUTPATIENT)
Dept: LAB | Facility: SURGERY CENTER | Age: 51
End: 2021-08-09

## 2021-08-09 DIAGNOSIS — Z01.818 OTHER SPECIFIED PRE-OPERATIVE EXAMINATION: Primary | ICD-10-CM

## 2021-08-09 NOTE — SIGNIFICANT NOTE
Patient educated on the following :    - If you are receiving Sedation for your procedure Nothing to Eat 6 hours and only clear liquids for 2 hours prior to your procedure.    -Your required COVID Test is Scheduled on    8/10      Between the Hours of 8090-8511  -You will only be notified if your COVID test Result is POSITIVE  -The importance of reducing your number of contacts by self quarantining after you COVID test until the date of your PROCEDURE  -You will need to have someone drive you home after your PROCEDURE and remain with you for 24 hours after the PROCEDURE  - The date of your procedure, your are welcome to have one visitor at bedside or remain within 10-15 minutes of [employer]  -You will need to arrive at  0915         on      8/12     for your PROCEDURE  -Please contact [employer] [facility] at: [phone] with any questions and/or concerns

## 2021-08-10 ENCOUNTER — LAB (OUTPATIENT)
Dept: LAB | Facility: SURGERY CENTER | Age: 51
End: 2021-08-10

## 2021-08-10 DIAGNOSIS — M47.816 LUMBAR FACET ARTHROPATHY: ICD-10-CM

## 2021-08-10 DIAGNOSIS — Z01.818 OTHER SPECIFIED PRE-OPERATIVE EXAMINATION: ICD-10-CM

## 2021-08-10 LAB — SARS-COV-2 ORF1AB RESP QL NAA+PROBE: NOT DETECTED

## 2021-08-10 PROCEDURE — U0004 COV-19 TEST NON-CDC HGH THRU: HCPCS | Performed by: SURGERY

## 2021-08-10 PROCEDURE — C9803 HOPD COVID-19 SPEC COLLECT: HCPCS

## 2021-08-12 ENCOUNTER — HOSPITAL ENCOUNTER (OUTPATIENT)
Dept: GENERAL RADIOLOGY | Facility: SURGERY CENTER | Age: 51
Setting detail: HOSPITAL OUTPATIENT SURGERY
End: 2021-08-12

## 2021-08-12 ENCOUNTER — HOSPITAL ENCOUNTER (OUTPATIENT)
Facility: SURGERY CENTER | Age: 51
Setting detail: HOSPITAL OUTPATIENT SURGERY
Discharge: HOME | End: 2021-08-12
Attending: ANESTHESIOLOGY | Admitting: ANESTHESIOLOGY

## 2021-08-12 VITALS
HEART RATE: 72 BPM | BODY MASS INDEX: 31.5 KG/M2 | DIASTOLIC BLOOD PRESSURE: 80 MMHG | SYSTOLIC BLOOD PRESSURE: 119 MMHG | TEMPERATURE: 97.3 F | OXYGEN SATURATION: 96 % | HEIGHT: 70 IN | RESPIRATION RATE: 16 BRPM | WEIGHT: 220 LBS

## 2021-08-12 DIAGNOSIS — M47.816 LUMBAR FACET ARTHROPATHY: ICD-10-CM

## 2021-08-12 PROCEDURE — 25010000002 MIDAZOLAM PER 1 MG: Performed by: ANESTHESIOLOGY

## 2021-08-12 PROCEDURE — 64636 DESTROY L/S FACET JNT ADDL: CPT | Performed by: ANESTHESIOLOGY

## 2021-08-12 PROCEDURE — BR16ZZZ FLUOROSCOPY OF LUMBAR FACET JOINT(S): ICD-10-PCS | Performed by: ANESTHESIOLOGY

## 2021-08-12 PROCEDURE — 25010000002 FENTANYL CITRATE (PF) 50 MCG/ML SOLUTION: Performed by: ANESTHESIOLOGY

## 2021-08-12 PROCEDURE — 64635 DESTROY LUMB/SAC FACET JNT: CPT | Performed by: ANESTHESIOLOGY

## 2021-08-12 PROCEDURE — 3E0T3TZ INTRODUCTION OF DESTRUCTIVE AGENT INTO PERIPHERAL NERVES AND PLEXI, PERCUTANEOUS APPROACH: ICD-10-PCS | Performed by: ANESTHESIOLOGY

## 2021-08-12 PROCEDURE — 99152 MOD SED SAME PHYS/QHP 5/>YRS: CPT | Performed by: ANESTHESIOLOGY

## 2021-08-12 RX ORDER — FENTANYL CITRATE 50 UG/ML
INJECTION, SOLUTION INTRAMUSCULAR; INTRAVENOUS AS NEEDED
Status: DISCONTINUED | OUTPATIENT
Start: 2021-08-12 | End: 2021-08-12 | Stop reason: HOSPADM

## 2021-08-12 RX ORDER — SODIUM CHLORIDE 0.9 % (FLUSH) 0.9 %
10 SYRINGE (ML) INJECTION AS NEEDED
Status: DISCONTINUED | OUTPATIENT
Start: 2021-08-12 | End: 2021-08-12 | Stop reason: HOSPADM

## 2021-08-12 RX ORDER — MIDAZOLAM HYDROCHLORIDE 1 MG/ML
INJECTION INTRAMUSCULAR; INTRAVENOUS AS NEEDED
Status: DISCONTINUED | OUTPATIENT
Start: 2021-08-12 | End: 2021-08-12 | Stop reason: HOSPADM

## 2021-08-12 RX ORDER — SODIUM CHLORIDE 0.9 % (FLUSH) 0.9 %
10 SYRINGE (ML) INJECTION EVERY 12 HOURS SCHEDULED
Status: DISCONTINUED | OUTPATIENT
Start: 2021-08-12 | End: 2021-08-12 | Stop reason: HOSPADM

## 2021-08-12 NOTE — DISCHARGE INSTRUCTIONS
You need to get the Covid Vaccine:    - this is said out of genuine concern for your health and safety  - it is safe and effective  - there is no scientific reason to not get it  - you put other people at risk with your decision to not protect yourself           [facility] - Post-procedure Instructions          --  While there are no absolute restrictions, it is recommended that you do not perform strenuous activity today. In the morning, you may resume your level of activity as before your block.    --  If you have a band-aid at your injection site, please remove it later today. Observe the area for any redness, swelling, pus-like drainage, or a temperature over 101°. If any of these symptoms occur, please call your doctor at [phone]. If after office hours, leave a message and the on-call provider will return your call.    --  Ice may be applied to your injection site. It is recommended you avoid direct heat (heating pad; hot tub) for 1-2 days.    --  Call [facility] at [phone] if you experience persistent headache, persistent bleeding from the injection site, or severe pain not relieved by heat or oral medication.    --  Do not make important decisions today.    --  Due to the effects of the block and/or the I.V. Sedation, DO NOT drive or operate hazardous machinery for 12 hours.  Local anesthetics may cause numbness after procedure and precautions must be taken with regards to operating equipment as well as with walking, even if ambulating with assistance of another person or with an assistive device.    --  Do not drink alcohol for 12 hours.    -- You may return to work tomorrow, or as directed by your referring doctor.    --  Occasionally you may notice a slight increase in your pain after the procedure. This should start to improve within the next 24-48 hours. Radiofrequency ablation procedure pain may last 3-4 weeks.    --  It may take as long as 3-4 days before you notice a  gradual improvement in your pain and/or other symptoms.    -- You may continue to take your prescribed pain medication as needed.    --  Some normal possible side effects of steroid use could include fluid retention, increased blood sugar, dull headache, increased sweating, increased appetite, mood swings and flushing.    --  Diabetics are recommended to watch their blood glucose level closely for 24-48 hours after the injection.    --  Must stay in PACU for 20 min upon arrival and prove no leg weakness before being discharged.    --  IN THE EVENT OF A LIFE THREATENING EMERGENCY, (CHEST PAIN, BREATHING DIFFICULTIES, PARALYSIS…) YOU SHOULD GO TO YOUR NEAREST EMERGENCY ROOM.    --  You should be contacted by our office within 2-3 days to schedule follow up or next appointment date.  If not contacted within 7 days, please call the office at [phone]

## 2021-08-12 NOTE — OP NOTE
Bilateral L3-5 Lumbar Medial Branch RADIOFREQUENCY  [facility]      PREOPERATIVE DIAGNOSIS:  Lumbar spondylosis without myelopathy    POSTOPERATIVE DIAGNOSIS:  Lumbar spondylosis without myelopathy    PROCEDURE:   Diagnostic Bilateral Lumbar Medial Branch Nerve thermal radiofrequency lesioning, with fluoroscopy:  L3, L4, and L5 nerves (at the L4 and L5 transverse processes and the sacral alar groove) to thermally treat the innervation to facet joints L4-5 and L5-S1  1. 96768-88 -- Bilateral L/S facet neuro destr., 1st Level  2. 18485-72 -- Bilateral L/S facet neuro destr., 2nd  Level    PRE-PROCEDURE DISCUSSION WITH PATIENT:    Risks and complications were discussed with the patient prior to starting the procedure and informed consent was obtained.      SURGEON:  Onesimo Patino MD    REASON FOR PROCEDURE:    The patient complains of pain that seems to have a significant axial component and Previous clinically significant therapeutic effect after prior Radiofrequency procedure    SEDATION:  Versed 6mg & Fentanyl 100 mcg IV  TIME OF PROCEDURE:   The intraoperative procedure time after administration of the sedative was 21 minutes.     ANESTHETIC:  Lidocaine 2%  STEROID:  NONE      DESCRIPTON OF PROCEDURE:  After obtaining informed consent, IV access  was obtained in the preoperative area.   The patient was taken to the operating room.  The patient was placed in the prone position with a pillow under the abdomen. All pressure points were well padded.  EKG, blood pressure, and pulse oximeter were monitored.  The patient was monitored and sedated by the RN under my direction. The lumbosacral area was prepped with Chloraprep and draped in a sterile fashion.     Under fluoroscopic guidance the transverse processes of the L4 and L5 vertebrae at the junctions of the superior articular processes were identified on the right.  Also identified was the groove between the ala and the superior articular  process of the sacrum on the ipsilateral side.  Skin and subcutaneous tissue were anesthetized with 1ml of 1% lidocaine above each of these points. Then, radiofrequency probe needles were advanced in this fluoro view to the above junctions.  Aspiration was negative for blood and CSF.  After confirming the position of the needle with fluoroscope in all views, testing was initiated.  First, sensory testing was started on each needle a 1V and 50Hz and slowly decreased until painful pressure stimulation diminished at 0.5V.  Next, motor testing was confirmed to be negative at 3V and 2Hz for any radicular stimulation.  Then 1mL of the local anesthetic was instilled in each needle.  Two minutes elapsed, and during this time a lateral fluoroscopic view was confirmed again to ensure the needles had not advanced nor retracted.  Then, Radiofrequency Lesioning was initiated for 2.5 minutes at 85 degrees Celsius.  Needles were removed intact from each of the areas.     A similar procedure was repeated to address the L3, L4, and L5 nerves on the contralateral side.   Onset of analgesia was noted.  Vital signs remained stable throughout.      ESTIMATED BLOOD LOSS:  <5 mL  SPECIMENS:  none    COMPLICATIONS:   No complications were noted. and The patient did not have any signs of postprocedure numbness nor weakness.    TOLERANCE & DISCHARGE CONDITION:    The patient tolerated the procedure well.  The patient was transported to the recovery area without difficulties.  The patient was discharged to home under the care of family in stable and satisfactory condition.    PLAN OF CARE:  1. The patient was given our standard instruction sheet.  2. The patient will  Return to clinic 4-6 wks.  3. The patient will resume all medications as per the medication reconciliation sheet.

## 2021-09-17 ENCOUNTER — OFFICE VISIT (OUTPATIENT)
Dept: PAIN MEDICINE | Facility: CLINIC | Age: 51
End: 2021-09-17

## 2021-09-17 VITALS
DIASTOLIC BLOOD PRESSURE: 73 MMHG | HEIGHT: 70 IN | RESPIRATION RATE: 16 BRPM | WEIGHT: 232.6 LBS | TEMPERATURE: 97.2 F | OXYGEN SATURATION: 100 % | HEART RATE: 96 BPM | BODY MASS INDEX: 33.3 KG/M2 | SYSTOLIC BLOOD PRESSURE: 116 MMHG

## 2021-09-17 DIAGNOSIS — G89.29 OTHER CHRONIC PAIN: ICD-10-CM

## 2021-09-17 DIAGNOSIS — G89.29 CHRONIC BILATERAL LOW BACK PAIN WITHOUT SCIATICA: Primary | ICD-10-CM

## 2021-09-17 DIAGNOSIS — M54.50 CHRONIC BILATERAL LOW BACK PAIN WITHOUT SCIATICA: Primary | ICD-10-CM

## 2021-09-17 DIAGNOSIS — M47.816 LUMBAR FACET ARTHROPATHY: ICD-10-CM

## 2021-09-17 PROCEDURE — 99212 OFFICE O/P EST SF 10 MIN: CPT | Performed by: NURSE PRACTITIONER

## 2021-09-17 NOTE — PROGRESS NOTES
CHIEF COMPLAINT  F/U procedure,Bilatearl L3-L5 RADIOFREQUENCY ABLATION LUMBAR , for back pain . Pt states RFA improved his pain up to 75%.     Subjective   Aldair Martinez is a 50 y.o. male  who presents to the office for follow-up of procedure.  He completed a Bilateral L3-L5 RFA   on  8/12/2021 performed by Dr. Patino for management of back pain. Patient reports 65-75% ONGOING relief from the procedure.     Today his pain is 3/10VAS in severity.     Patient remained masked during entire encounter. No cough present. I donned a mask and eye protection throughout entire visit. Prior to donning mask and eye protection, hand hygiene was performed, as well as when it was doffed.  I was closer than 6 feet, but not for an extended period of time. No obvious exposure to any bodily fluids.    Back Pain  This is a chronic problem. The current episode started more than 1 year ago. The problem occurs daily. Progression since onset: improved since last office visit. The pain is present in the lumbar spine. The quality of the pain is described as aching and burning. The pain does not radiate. The pain is at a severity of 3/10. The pain is moderate. The symptoms are aggravated by position, sitting and lying down. Pertinent negatives include no abdominal pain, chest pain, dysuria, fever, headaches, numbness or weakness. He has tried home exercises (PT, RFL) for the symptoms. The treatment provided significant (RFL) relief.      PEG Assessment   What number best describes your pain on average in the past week?3  What number best describes how, during the past week, pain has interfered with your enjoyment of life?3  What number best describes how, during the past week, pain has interfered with your general activity?  3    The following portions of the patient's history were reviewed and updated as appropriate: allergies, current medications, past family history, past medical history, past social history, past surgical history and  "problem list.    Review of Systems   Constitutional: Negative for activity change, fatigue and fever.   HENT: Negative for congestion.    Eyes: Negative for visual disturbance.   Respiratory: Negative for cough and chest tightness.    Cardiovascular: Negative for chest pain.   Gastrointestinal: Negative for abdominal pain, constipation and diarrhea.   Genitourinary: Negative for difficulty urinating and dysuria.   Musculoskeletal: Positive for back pain.   Neurological: Negative for dizziness, weakness, numbness and headaches.   Psychiatric/Behavioral: Negative for agitation, sleep disturbance and suicidal ideas. The patient is not nervous/anxious.      --  The aforementioned information the Chief Complaint section and above subjective data including any HPI data, and also the Review of Systems data, has been personally reviewed and affirmed.  --     Vitals:    09/17/21 1234   BP: 116/73   Pulse: 96   Resp: 16   Temp: 97.2 °F (36.2 °C)   TempSrc: Temporal   SpO2: 100%   Weight: 106 kg (232 lb 9.6 oz)   Height: 177.8 cm (70\")   PainSc:   3   PainLoc: Back     Objective   Physical Exam  Vitals and nursing note reviewed.   Constitutional:       Appearance: Normal appearance. He is well-developed.   Eyes:      General: Lids are normal.   Cardiovascular:      Rate and Rhythm: Normal rate.   Pulmonary:      Effort: Pulmonary effort is normal.   Musculoskeletal:      Lumbar back: Tenderness present. Decreased range of motion.   Neurological:      Mental Status: He is alert and oriented to person, place, and time.      Gait: Gait normal.   Psychiatric:         Attention and Perception: Attention normal.         Mood and Affect: Mood normal.         Speech: Speech normal.         Behavior: Behavior normal.         Judgment: Judgment normal.       Assessment/Plan   Diagnoses and all orders for this visit:    1. Chronic bilateral low back pain without sciatica (Primary)    2. Lumbar facet arthropathy    3. Other chronic " pain      --- Discussed that Lumbar RFA can be Repeat every 6 months PRN, will see back in the office for eval prior to repeating this.   --- Follow-up 6 months or sooner if needed. Discussed that if his pain is still well controlled at that time he can push this appointment out. Patient states understanding.      RAMY REPORT    As the clinician, I personally reviewed the RAMY from 9/17/2021 while the patient was in the office today.     Dictated utilizing Dragon dictation.

## 2021-11-01 ENCOUNTER — TELEPHONE (OUTPATIENT)
Dept: PAIN MEDICINE | Facility: CLINIC | Age: 51
End: 2021-11-01

## 2021-11-01 RX ORDER — METHYLPREDNISOLONE 4 MG/1
TABLET ORAL
Qty: 1 EACH | Refills: 0 | Status: ON HOLD | OUTPATIENT
Start: 2021-11-01 | End: 2022-03-29

## 2021-11-01 NOTE — TELEPHONE ENCOUNTER
Called pt to gather more info , pt stated he had a radio frequency back in August,2021 and is now having severe pain, radio frequency has worn out . And is requesting to be seen sooner than his upcoming appointment 03/18/2022. Unless there is another POC?

## 2021-11-01 NOTE — TELEPHONE ENCOUNTER
Caller: JEFFREY BARTLETT    Relationship to patient: SELF    Best call back number: [phone]   Chief complaint: PAIN IN LOWER LUMBAR    Type of visit: ELECTRO FREQUENCY OR OTHER INJECTION    Requested date: AS SOON AS PATIENT CAN    If rescheduling, when is the original appointment: NA    Additional notes: PATIENT STARTED HAVING PAIN ABOUT A MONTH AGO THAT IS GETTING WORSE.

## 2021-11-01 NOTE — TELEPHONE ENCOUNTER
Unfortunately RFA can only be repeated every 6 months. We can try a steroid dose pack and see if this will help decrease his pain. We can also move up his appt some. Thanks

## 2021-11-01 NOTE — TELEPHONE ENCOUNTER
Pt called back and stated there has been no injury nor muscle strain . Pain just started gradually . Thinks radiofrequency has worn out .

## 2022-03-18 ENCOUNTER — OFFICE VISIT (OUTPATIENT)
Dept: PAIN MEDICINE | Facility: CLINIC | Age: 52
End: 2022-03-18

## 2022-03-18 ENCOUNTER — PREP FOR SURGERY (OUTPATIENT)
Dept: SURGERY | Facility: SURGERY CENTER | Age: 52
End: 2022-03-18

## 2022-03-18 ENCOUNTER — TRANSCRIBE ORDERS (OUTPATIENT)
Dept: SURGERY | Facility: SURGERY CENTER | Age: 52
End: 2022-03-18

## 2022-03-18 VITALS
TEMPERATURE: 97.5 F | DIASTOLIC BLOOD PRESSURE: 79 MMHG | HEIGHT: 70 IN | HEART RATE: 65 BPM | WEIGHT: 231.8 LBS | SYSTOLIC BLOOD PRESSURE: 119 MMHG | OXYGEN SATURATION: 100 % | BODY MASS INDEX: 33.18 KG/M2

## 2022-03-18 DIAGNOSIS — M54.50 CHRONIC BILATERAL LOW BACK PAIN WITHOUT SCIATICA: ICD-10-CM

## 2022-03-18 DIAGNOSIS — M47.816 LUMBAR FACET ARTHROPATHY: ICD-10-CM

## 2022-03-18 DIAGNOSIS — G89.29 CHRONIC BILATERAL LOW BACK PAIN WITHOUT SCIATICA: ICD-10-CM

## 2022-03-18 DIAGNOSIS — Z41.9 SURGERY, ELECTIVE: Primary | ICD-10-CM

## 2022-03-18 DIAGNOSIS — M47.816 LUMBAR FACET ARTHROPATHY: Primary | ICD-10-CM

## 2022-03-18 DIAGNOSIS — G89.29 OTHER CHRONIC PAIN: Primary | ICD-10-CM

## 2022-03-18 PROCEDURE — 99214 OFFICE O/P EST MOD 30 MIN: CPT | Performed by: NURSE PRACTITIONER

## 2022-03-18 RX ORDER — SODIUM CHLORIDE 0.9 % (FLUSH) 0.9 %
10 SYRINGE (ML) INJECTION EVERY 12 HOURS SCHEDULED
Status: CANCELLED | OUTPATIENT
Start: 2022-03-18

## 2022-03-18 RX ORDER — SODIUM CHLORIDE 0.9 % (FLUSH) 0.9 %
10 SYRINGE (ML) INJECTION AS NEEDED
Status: CANCELLED | OUTPATIENT
Start: 2022-03-18

## 2022-03-18 RX ORDER — LOVASTATIN 40 MG/1
40 TABLET ORAL
COMMUNITY
Start: 2021-12-12

## 2022-03-18 NOTE — PROGRESS NOTES
CHIEF COMPLAINT  F/U back pain- patient states that his pain has been worsening since his last visit.     Subjective   Aldair Martinez is a 51 y.o. male  who presents for follow-up.  He has a history of back pain.  Today his pain is 5/10VAS in severity. He states that his pain has gradually started to return in his low back.  His pain is worsened by prolonged standing and with sleeping.  His pain is improved by use of hot tub and intermittent Radiofrequency ablations.     Procedure list:  8/12/2021-bilateral L3-L5 RFA-65 to 75% relief  12/31/2020-bilateral L3-L5 RFA-60 to 70% relief  11/3/2020-bilateral L3-L5 MBB-80 to 85% diagnostic relief  6/1/2020-bilateral L3-L5 RFA-75 to 80% relief  1/15/2020-bilateral L3-L5 MBB-80% diagnostic relief  12/12/2019-bilateral L3-L5 MBB-80% diagnostic relief    Patient remained masked during entire encounter. No cough present. I donned a mask and eye protection throughout entire visit. Prior to donning mask and eye protection, hand hygiene was performed, as well as when it was doffed.  I was closer than 6 feet, but not for an extended period of time. No obvious exposure to any bodily fluids.    Back Pain  This is a chronic problem. The current episode started more than 1 year ago. The problem occurs daily. Progression since onset: worsened since last office visit. The pain is present in the lumbar spine. The quality of the pain is described as aching and burning. The pain does not radiate. The pain is at a severity of 5/10. The pain is moderate. The symptoms are aggravated by position, sitting and lying down. Pertinent negatives include no abdominal pain, chest pain, dysuria, fever, headaches, numbness or weakness. He has tried home exercises (PT, RFL) for the symptoms. The treatment provided significant (RFL) relief.      PEG Assessment   What number best describes your pain on average in the past week?5  What number best describes how, during the past week, pain has interfered  "with your enjoyment of life?0  What number best describes how, during the past week, pain has interfered with your general activity?  0    The following portions of the patient's history were reviewed and updated as appropriate: allergies, current medications, past family history, past medical history, past social history, past surgical history and problem list.    Review of Systems   Constitutional: Negative for activity change, chills, fatigue and fever.   HENT: Negative for congestion.    Eyes: Negative for visual disturbance.   Respiratory: Negative for chest tightness and shortness of breath.    Cardiovascular: Negative for chest pain.   Gastrointestinal: Negative for abdominal pain, constipation and diarrhea.   Genitourinary: Negative for difficulty urinating and dysuria.   Musculoskeletal: Positive for back pain.   Neurological: Negative for dizziness, weakness, light-headedness, numbness and headaches.   Psychiatric/Behavioral: Positive for sleep disturbance. Negative for agitation. The patient is not nervous/anxious.      --  The aforementioned information the Chief Complaint section and above subjective data including any HPI data, and also the Review of Systems data, has been personally reviewed and affirmed.  --    Vitals:    03/18/22 1115   BP: 119/79   Pulse: 65   Temp: 97.5 °F (36.4 °C)   SpO2: 100%   Weight: 105 kg (231 lb 12.8 oz)   Height: 177.8 cm (70\")   PainSc:   5   PainLoc: Back     Objective   Physical Exam  Vitals and nursing note reviewed.   Constitutional:       Appearance: Normal appearance. He is well-developed.   Eyes:      General: Lids are normal.   Cardiovascular:      Rate and Rhythm: Normal rate.   Pulmonary:      Effort: Pulmonary effort is normal.   Musculoskeletal:      Cervical back: Normal range of motion.      Lumbar back: Tenderness and bony tenderness present. Decreased range of motion.      Comments: +Lumbar facet loading   Neurological:      Mental Status: He is alert and " oriented to person, place, and time.   Psychiatric:         Attention and Perception: Attention normal.         Mood and Affect: Mood normal.         Speech: Speech normal.         Behavior: Behavior normal.         Judgment: Judgment normal.       Assessment/Plan   Diagnoses and all orders for this visit:    1. Other chronic pain (Primary)    2. Lumbar facet arthropathy    3. Chronic bilateral low back pain without sciatica      --- Repeat Bilateral L3-L5 RFA  Reviewed the procedure at length with the patient.  Included in the review was expectations, complications, risk and benefits.The procedure was described in detail and the risks, benefits and alternatives were discussed with the patient (including but not limited to: bleeding, infection, nerve damage, worsening of pain, inability to perform injection, paralysis, seizures, coma, no pain relief and death) who agreed to proceed.  Discussed the potential for sedation if warranted/wanted.  The procedure will plan to be performed at [facility] with fluoroscopic guidance(unless ultrasound is indicated) and could potentially have steroids and contrast dye used. Questions were answered and in a way the patient could understand.  Patient verbalized understanding and wishes to proceed.  This intervention will be ordered.  Discussed with patient that all procedures are part of a multimodal plan of care and include either formal PT or a home exercise program.  Patient has no evidence of coagulopathy or current infection.    Discussed with the patient that sedation is optional for this procedure.  The sedation offered is called conscious sedation which is different from general anesthesia that is utilized in surgical procedures. The dosing of the sedation is determined by the physician and they will be monitored throughout the procedure. With conscious sedation it is possible to remember parts or all of the procedure, this is normal. They will need to  have a  with them as driving is prohibited following conscious sedation.     NPO instructions for conscious sedation:  --- Do not eat 6 hours prior to the procedure.   --- Do not drink any dairy or citrus 4 hours prior to the procedure.   --- Do not drink anything, including clear liquids, 2 hours prior to procedure.     If the NPO instructions are not followed then the procedure may be performed without sedation or the procedure will need to be rescheduled.   --- Recommend indirect ice following this procedure for 20-30 mins 3-4 times a day. Patient states understanding.   --- Follow-up after procedure     RAMY REPORT    As the clinician, I personally reviewed the RAMY from 3/18/2022 while the patient was in the office today.    Dictated utilizing Dragon dictation.     This document is intended for medical expert use only. Reading of this document by patients and/or patient's family without participating medical staff guidance may result in misinterpretation and unintended morbidity.   Any interpretation of such data is the responsibility of the patient and/or family member responsible for the patient in concert with their primary or specialist providers, not to be left for sources of online searches such as [employer], [employer] or similar queries. Relying on these approaches to knowledge may result in misinterpretation, misguided goals of care and even death should patients or family members try recommendations outside of the realm of professional medical care in a supervised way.

## 2022-03-26 DIAGNOSIS — M47.816 LUMBAR FACET ARTHROPATHY: Primary | ICD-10-CM

## 2022-03-26 LAB — SARS-COV-2 ORF1AB RESP QL NAA+PROBE: NOT DETECTED

## 2022-03-26 PROCEDURE — C9803 HOPD COVID-19 SPEC COLLECT: HCPCS

## 2022-03-26 PROCEDURE — U0004 COV-19 TEST NON-CDC HGH THRU: HCPCS | Performed by: ANESTHESIOLOGY

## 2022-03-29 ENCOUNTER — HOSPITAL ENCOUNTER (OUTPATIENT)
Dept: GENERAL RADIOLOGY | Facility: SURGERY CENTER | Age: 52
Setting detail: HOSPITAL OUTPATIENT SURGERY
End: 2022-03-29

## 2022-03-29 ENCOUNTER — HOSPITAL ENCOUNTER (OUTPATIENT)
Facility: SURGERY CENTER | Age: 52
Setting detail: HOSPITAL OUTPATIENT SURGERY
Discharge: HOME | End: 2022-03-29
Attending: ANESTHESIOLOGY | Admitting: ANESTHESIOLOGY

## 2022-03-29 VITALS
RESPIRATION RATE: 18 BRPM | SYSTOLIC BLOOD PRESSURE: 138 MMHG | HEART RATE: 66 BPM | TEMPERATURE: 98 F | OXYGEN SATURATION: 96 % | DIASTOLIC BLOOD PRESSURE: 89 MMHG

## 2022-03-29 DIAGNOSIS — Z41.9 SURGERY, ELECTIVE: ICD-10-CM

## 2022-03-29 DIAGNOSIS — M47.816 LUMBAR FACET ARTHROPATHY: ICD-10-CM

## 2022-03-29 PROCEDURE — 25010000002 MIDAZOLAM PER 1 MG: Performed by: ANESTHESIOLOGY

## 2022-03-29 PROCEDURE — 64636 DESTROY L/S FACET JNT ADDL: CPT | Performed by: ANESTHESIOLOGY

## 2022-03-29 PROCEDURE — 99152 MOD SED SAME PHYS/QHP 5/>YRS: CPT | Performed by: ANESTHESIOLOGY

## 2022-03-29 PROCEDURE — 64635 DESTROY LUMB/SAC FACET JNT: CPT | Performed by: ANESTHESIOLOGY

## 2022-03-29 PROCEDURE — 76000 FLUOROSCOPY <1 HR PHYS/QHP: CPT

## 2022-03-29 PROCEDURE — 25010000002 FENTANYL CITRATE (PF) 50 MCG/ML SOLUTION: Performed by: ANESTHESIOLOGY

## 2022-03-29 RX ORDER — MIDAZOLAM HYDROCHLORIDE 1 MG/ML
INJECTION INTRAMUSCULAR; INTRAVENOUS AS NEEDED
Status: DISCONTINUED | OUTPATIENT
Start: 2022-03-29 | End: 2022-03-29 | Stop reason: HOSPADM

## 2022-03-29 RX ORDER — SODIUM CHLORIDE 0.9 % (FLUSH) 0.9 %
10 SYRINGE (ML) INJECTION AS NEEDED
Status: DISCONTINUED | OUTPATIENT
Start: 2022-03-29 | End: 2022-03-29 | Stop reason: HOSPADM

## 2022-03-29 RX ORDER — SODIUM CHLORIDE 0.9 % (FLUSH) 0.9 %
10 SYRINGE (ML) INJECTION EVERY 12 HOURS SCHEDULED
Status: DISCONTINUED | OUTPATIENT
Start: 2022-03-29 | End: 2022-03-29 | Stop reason: HOSPADM

## 2022-03-29 RX ORDER — FENTANYL CITRATE 50 UG/ML
INJECTION, SOLUTION INTRAMUSCULAR; INTRAVENOUS AS NEEDED
Status: DISCONTINUED | OUTPATIENT
Start: 2022-03-29 | End: 2022-03-29 | Stop reason: HOSPADM

## 2022-04-05 ENCOUNTER — APPOINTMENT (OUTPATIENT)
Dept: GENERAL RADIOLOGY | Facility: SURGERY CENTER | Age: 52
End: 2022-04-05

## 2022-05-17 ENCOUNTER — OFFICE VISIT (OUTPATIENT)
Dept: PAIN MEDICINE | Facility: CLINIC | Age: 52
End: 2022-05-17

## 2022-05-17 VITALS
HEART RATE: 63 BPM | HEIGHT: 70 IN | TEMPERATURE: 96.9 F | OXYGEN SATURATION: 97 % | RESPIRATION RATE: 12 BRPM | WEIGHT: 230.6 LBS | DIASTOLIC BLOOD PRESSURE: 80 MMHG | SYSTOLIC BLOOD PRESSURE: 125 MMHG | BODY MASS INDEX: 33.01 KG/M2

## 2022-05-17 DIAGNOSIS — M47.816 LUMBAR FACET ARTHROPATHY: Primary | ICD-10-CM

## 2022-05-17 DIAGNOSIS — G89.29 CHRONIC BILATERAL LOW BACK PAIN WITHOUT SCIATICA: ICD-10-CM

## 2022-05-17 DIAGNOSIS — M54.50 CHRONIC BILATERAL LOW BACK PAIN WITHOUT SCIATICA: ICD-10-CM

## 2022-05-17 PROCEDURE — 99213 OFFICE O/P EST LOW 20 MIN: CPT | Performed by: NURSE PRACTITIONER

## 2022-05-17 RX ORDER — METHYLPREDNISOLONE 4 MG/1
TABLET ORAL
Qty: 1 EACH | Refills: 0 | Status: SHIPPED | OUTPATIENT
Start: 2022-05-17 | End: 2022-08-24

## 2022-05-17 RX ORDER — CYCLOBENZAPRINE HCL 5 MG
5 TABLET ORAL NIGHTLY PRN
Qty: 14 TABLET | Refills: 0 | Status: SHIPPED | OUTPATIENT
Start: 2022-05-17 | End: 2022-08-24

## 2022-05-17 NOTE — PROGRESS NOTES
"CHIEF COMPLAINT  PROCEDURE FOLLOW UP Bilateral L3-L5 RADIOFREQUENCY ABLATION LUMBAR.  Patient in office states RFA was 80 % successful and has continued to remain in effect . States he is only experiencing soreness due to increased activity.     Subjective   Aldair Martinez is a 51 y.o. male  who presents to the office for follow-up of procedure.  He completed a Bilateral L3-L5 RFA   on  3/29/2022 performed by Dr. Patino for management of back pain. Patient reports 80% ONGOING relief from the procedure.     Today his pain is 6/10VAS in severity. 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 He states it feels \"tight\" with intermittent sharp pains, but his pain is overall improved since his RFA.      Procedure list:  8/12/2021-bilateral L3-L5 RFA-65 to 75% relief  12/31/2020-bilateral L3-L5 RFA-60 to 70% relief  11/3/2020-bilateral L3-L5 MBB-80 to 85% diagnostic relief  6/1/2020-bilateral L3-L5 RFA-75 to 80% relief  1/15/2020-bilateral L3-L5 MBB-80% diagnostic relief  12/12/2019-bilateral L3-L5 MBB-80% diagnostic relief    Patient remained masked during entire encounter. No cough present. I donned a mask and eye protection throughout entire visit. Prior to donning mask and eye protection, hand hygiene was performed, as well as when it was doffed.  I was closer than 6 feet, but not for an extended period of time. No obvious exposure to any bodily fluids.    Back Pain  This is a chronic problem. The current episode started more than 1 year ago. The problem occurs daily. The problem has been waxing and waning since onset. The pain is present in the lumbar spine. The quality of the pain is described as aching and burning. The pain does not radiate. The pain is at a severity of 6/10. The pain is moderate. The symptoms are aggravated by position, sitting and lying down. Pertinent negatives include no abdominal pain, chest pain, dysuria, fever, headaches, numbness or weakness. He has tried home exercises " "(PT, RFL) for the symptoms. The treatment provided significant (RFL) relief.      PEG Assessment   What number best describes your pain on average in the past week?6  What number best describes how, during the past week, pain has interfered with your enjoyment of life?6  What number best describes how, during the past week, pain has interfered with your general activity?  6    The following portions of the patient's history were reviewed and updated as appropriate: allergies, current medications, past family history, past medical history, past social history, past surgical history and problem list.    Review of Systems   Constitutional: Positive for activity change (increased) and fatigue. Negative for fever.   HENT: Negative for congestion.    Eyes: Negative for visual disturbance.   Respiratory: Negative for cough and chest tightness.    Cardiovascular: Negative for chest pain.   Gastrointestinal: Negative for abdominal pain, constipation and diarrhea.   Genitourinary: Negative for difficulty urinating and dysuria.   Musculoskeletal: Positive for back pain.   Neurological: Negative for dizziness, weakness, light-headedness, numbness and headaches.   Psychiatric/Behavioral: Positive for sleep disturbance. Negative for agitation and suicidal ideas. The patient is not nervous/anxious.      --  The aforementioned information the Chief Complaint section and above subjective data including any HPI data, and also the Review of Systems data, has been personally reviewed and affirmed.  --     Vitals:    05/17/22 1121   BP: 125/80   BP Location: Right leg   Patient Position: Sitting   Pulse: 63   Resp: 12   Temp: 96.9 °F (36.1 °C)   SpO2: 97%   Weight: 105 kg (230 lb 9.6 oz)   Height: 177.8 cm (70\")   PainSc:   6   PainLoc: Back     Objective   Physical Exam  Vitals and nursing note reviewed.   Constitutional:       Appearance: Normal appearance. He is well-developed.   Eyes:      General: Lids are normal.   Cardiovascular: "      Rate and Rhythm: Normal rate.   Pulmonary:      Effort: Pulmonary effort is normal.   Musculoskeletal:      Cervical back: Normal range of motion.      Lumbar back: Spasms and tenderness present. Decreased range of motion.   Neurological:      Mental Status: He is alert and oriented to person, place, and time.   Psychiatric:         Attention and Perception: Attention normal.         Mood and Affect: Mood normal.         Speech: Speech normal.         Behavior: Behavior normal.         Judgment: Judgment normal.       Assessment & Plan   Diagnoses and all orders for this visit:    1. Lumbar facet arthropathy (Primary)  -     methylPREDNISolone (MEDROL) 4 MG dose pack; Take as directed on package instructions.  Dispense: 1 each; Refill: 0    2. Chronic bilateral low back pain without sciatica  -     methylPREDNISolone (MEDROL) 4 MG dose pack; Take as directed on package instructions.  Dispense: 1 each; Refill: 0    Other orders  -     cyclobenzaprine (FLEXERIL) 5 MG tablet; Take 1 tablet by mouth At Night As Needed for Muscle Spasms.  Dispense: 14 tablet; Refill: 0      --- Medrol dose pack for acutely worsened low back pain  --- Flexeril 5 mg nightly x 2 weeks to help with muscle spasms  --- Discussed that his Lumbar RFA can be repeated as soon as every 6 months.   --- Follow-up if pain fails to improve or worsens.      RAMY REPORT    As the clinician, I personally reviewed the RAMY from 5/17/2022 while the patient was in the office today.    Dictated utilizing Dragon dictation.      This document is intended for medical expert use only. Reading of this document by patients and/or patient's family without participating medical staff guidance may result in misinterpretation and unintended morbidity.   Any interpretation of such data is the responsibility of the patient and/or family member responsible for the patient in concert with their primary or specialist providers, not to be left for sources of online  searches such as [employer], [employer] or similar queries. Relying on these approaches to knowledge may result in misinterpretation, misguided goals of care and even death should patients or family members try recommendations outside of the realm of professional medical care in a supervised way.

## 2022-08-24 ENCOUNTER — PREP FOR SURGERY (OUTPATIENT)
Dept: SURGERY | Facility: SURGERY CENTER | Age: 52
End: 2022-08-24

## 2022-08-24 ENCOUNTER — OFFICE VISIT (OUTPATIENT)
Dept: PAIN MEDICINE | Facility: CLINIC | Age: 52
End: 2022-08-24

## 2022-08-24 VITALS
SYSTOLIC BLOOD PRESSURE: 136 MMHG | DIASTOLIC BLOOD PRESSURE: 80 MMHG | TEMPERATURE: 98.7 F | WEIGHT: 228.4 LBS | OXYGEN SATURATION: 97 % | BODY MASS INDEX: 32.7 KG/M2 | RESPIRATION RATE: 18 BRPM | HEIGHT: 70 IN | HEART RATE: 62 BPM

## 2022-08-24 DIAGNOSIS — G89.29 CHRONIC BILATERAL LOW BACK PAIN WITHOUT SCIATICA: ICD-10-CM

## 2022-08-24 DIAGNOSIS — M62.838 MUSCLE SPASM: ICD-10-CM

## 2022-08-24 DIAGNOSIS — M47.816 LUMBAR FACET ARTHROPATHY: Primary | ICD-10-CM

## 2022-08-24 DIAGNOSIS — M54.50 CHRONIC BILATERAL LOW BACK PAIN WITHOUT SCIATICA: ICD-10-CM

## 2022-08-24 DIAGNOSIS — G89.29 OTHER CHRONIC PAIN: ICD-10-CM

## 2022-08-24 PROCEDURE — 99214 OFFICE O/P EST MOD 30 MIN: CPT | Performed by: NURSE PRACTITIONER

## 2022-08-24 RX ORDER — SODIUM CHLORIDE 0.9 % (FLUSH) 0.9 %
10 SYRINGE (ML) INJECTION AS NEEDED
Status: CANCELLED | OUTPATIENT
Start: 2022-08-24

## 2022-08-24 RX ORDER — CYCLOBENZAPRINE HCL 5 MG
5 TABLET ORAL 2 TIMES DAILY PRN
Qty: 60 TABLET | Refills: 0 | Status: SHIPPED | OUTPATIENT
Start: 2022-08-24 | End: 2022-11-11 | Stop reason: SDUPTHER

## 2022-08-24 RX ORDER — SODIUM CHLORIDE 0.9 % (FLUSH) 0.9 %
10 SYRINGE (ML) INJECTION EVERY 12 HOURS SCHEDULED
Status: CANCELLED | OUTPATIENT
Start: 2022-08-24

## 2022-08-24 NOTE — PROGRESS NOTES
CHIEF COMPLAINT  Follow-up for back pain.    Subjective   Aldair Martinez is a 51 y.o. male  who presents for follow-up.  He has a history of back pain.  Today his pain is 6/10VAS in severity. He states that his low back pain is slowly starting to return.  His low back pain radiates across his belt-line in a band like fashion.  This is not radiating into his lower extremities. He denies any lower extremity n/t or bowel or bladder incontinence.     He continues to work in [email or web address] of [employer] and [employer] which is a very physical job.      Procedure list:  3/29/2022-bilateral L3-L5 RFA-80% relief  8/12/2021-bilateral L3-L5 RFA-65 to 75% relief  12/31/2020-bilateral L3-L5 RFA-60 to 70% relief  11/3/2020-bilateral L3-L5 MBB-80 to 85% diagnostic relief  6/1/2020-bilateral L3-L5 RFA-75 to 80% relief  1/15/2020-bilateral L3-L5 MBB-80% diagnostic relief  12/12/2019-bilateral L3-L5 MBB-80% diagnostic relief    Back Pain  This is a chronic problem. The current episode started more than 1 year ago. The problem occurs daily. The problem has been gradually worsening since onset. The pain is present in the lumbar spine. The quality of the pain is described as aching and burning. The pain does not radiate. The pain is at a severity of 6/10. The pain is moderate. The symptoms are aggravated by position, sitting and lying down. Pertinent negatives include no abdominal pain, chest pain, dysuria, fever, headaches, numbness or weakness. He has tried home exercises (PT, RFL) for the symptoms. The treatment provided significant (RFL) relief.      PEG Assessment   What number best describes your pain on average in the past week?6  What number best describes how, during the past week, pain has interfered with your enjoyment of life?0  What number best describes how, during the past week, pain has interfered with your general activity?  0    The following portions of the patient's history were reviewed and updated as appropriate: allergies,  "current medications, past family history, past medical history, past social history, past surgical history and problem list.    Review of Systems   Constitutional: Negative for fatigue and fever.   HENT: Negative for congestion.    Eyes: Negative for visual disturbance.   Respiratory: Negative for shortness of breath.    Cardiovascular: Negative for chest pain.   Gastrointestinal: Negative for abdominal pain, constipation and diarrhea.   Genitourinary: Negative for difficulty urinating and dysuria.   Musculoskeletal: Positive for back pain.   Neurological: Negative for weakness, numbness and headaches.   Psychiatric/Behavioral: Negative for sleep disturbance and suicidal ideas. The patient is not nervous/anxious.      --  The aforementioned information the Chief Complaint section and above subjective data including any HPI data, and also the Review of Systems data, has been personally reviewed and affirmed.  --    Vitals:    08/24/22 1508   BP: 136/80   Pulse: 62   Resp: 18   Temp: 98.7 °F (37.1 °C)   SpO2: 97%   Weight: 104 kg (228 lb 6.4 oz)   Height: 177.8 cm (70\")   PainSc:   6   PainLoc: Back     Objective   Physical Exam  Vitals and nursing note reviewed.   Constitutional:       Appearance: Normal appearance. He is well-developed.   Eyes:      General: Lids are normal.   Cardiovascular:      Rate and Rhythm: Normal rate.   Pulmonary:      Effort: Pulmonary effort is normal.   Musculoskeletal:      Lumbar back: Tenderness and bony tenderness present. Decreased range of motion.      Comments:   +Lumbar facet loading   Neurological:      Mental Status: He is alert and oriented to person, place, and time.   Psychiatric:         Attention and Perception: Attention normal.         Mood and Affect: Mood normal.         Speech: Speech normal.         Behavior: Behavior normal.         Judgment: Judgment normal.       Assessment & Plan   Diagnoses and all orders for this visit:    1. Lumbar facet arthropathy " (Primary)    2. Chronic bilateral low back pain without sciatica    3. Other chronic pain    4. Muscle spasm    Other orders  -     cyclobenzaprine (FLEXERIL) 5 MG tablet; Take 1 tablet by mouth 2 (Two) Times a Day As Needed for Muscle Spasms.  Dispense: 60 tablet; Refill: 0      --- Repeat Bilateral L3-L5 RFA after 9/29/2022.   Reviewed the procedure at length with the patient.  Included in the review was expectations, complications, risk and benefits.The procedure was described in detail and the risks, benefits and alternatives were discussed with the patient (including but not limited to: bleeding, infection, nerve damage, worsening of pain, inability to perform injection, paralysis, seizures, coma, no pain relief and death) who agreed to proceed.  Discussed the potential for sedation if warranted/wanted.  The procedure will plan to be performed at [facility] with fluoroscopic guidance(unless ultrasound is indicated) and could potentially have steroids and contrast dye used. Questions were answered and in a way the patient could understand.  Patient verbalized understanding and wishes to proceed.  This intervention will be ordered.  Discussed with patient that all procedures are part of a multimodal plan of care and include either formal PT or a home exercise program.  Patient has no evidence of coagulopathy or current infection.    --- States flexeril was helpful previously, will prescribe this to help decrease the muscle spasms he is experiencing in his low back.   --- Recommend considering a lift belt to support his low back when lifting heavy seats while at work.   --- Follow-up after procedure     Dictated utilizing Dragon dictation.     This document is intended for medical expert use only. Reading of this document by patients and/or patient's family without participating medical staff guidance may result in misinterpretation and unintended morbidity.   Any interpretation of such data  is the responsibility of the patient and/or family member responsible for the patient in concert with their primary or specialist providers, not to be left for sources of online searches such as [employer], [employer] or similar queries. Relying on these approaches to knowledge may result in misinterpretation, misguided goals of care and even death should patients or family members try recommendations outside of the realm of professional medical care in a supervised way.    Patient remained masked during entire encounter. No cough present. I donned a mask and eye protection throughout entire visit. Prior to donning mask and eye protection, hand hygiene was performed, as well as when it was doffed.  I was closer than 6 feet, but not for an extended period of time. No obvious exposure to any bodily fluids.

## 2022-08-24 NOTE — PROGRESS NOTES
"CHIEF COMPLAINT  Follow-up for back pain.      Subjective   Aldair Martinez is a 51 y.o. male  who presents to the office for follow-up of procedure.  He completed a Bilateral L3-5 Lumbar Medial Branch RADIOFREQUENCY   on  3/29/2022 performed by Dr. Patino for management of back pain. Patient reports *** relief from the procedure.             History of Present Illness     PEG Assessment   What number best describes your pain on average in the past week?{NUMBERS 0-10:57906}  What number best describes how, during the past week, pain has interfered with your enjoyment of life?{NUMBERS 0-10:63000}  What number best describes how, during the past week, pain has interfered with your general activity?  {NUMBERS 0-10:03670}    Review of Pertinent Medical Data ---  ***    {Common H&P Review Areas:43566}    Review of Systems  ***   Vitals:    08/24/22 1508   Height: 177.8 cm (70\")         Objective   Physical Exam        Assessment & Plan   {Assess/PlanSmartLinks:25533}    --- Follow-up ***               ***       Dictated utilizing Dragon dictation.      This document is intended for medical expert use only. Reading of this document by patients and/or patient's family without participating medical staff guidance may result in misinterpretation and unintended morbidity.   Any interpretation of such data is the responsibility of the patient and/or family member responsible for the patient in concert with their primary or specialist providers, not to be left for sources of online searches such as [employer], [employer] or similar queries. Relying on these approaches to knowledge may result in misinterpretation, misguided goals of care and even death should patients or family members try recommendations outside of the realm of professional medical care in a supervised way.    Patient remained masked during entire encounter. No cough present. I donned a mask and eye protection throughout entire visit. Prior to donning mask and eye " protection, hand hygiene was performed, as well as when it was doffed.  I was closer than 6 feet, but not for an extended period of time. No obvious exposure to any bodily fluids.   6

## 2022-08-25 ENCOUNTER — TRANSCRIBE ORDERS (OUTPATIENT)
Dept: SURGERY | Facility: SURGERY CENTER | Age: 52
End: 2022-08-25

## 2022-08-25 DIAGNOSIS — M47.816 LUMBAR FACET ARTHROPATHY: Primary | ICD-10-CM

## 2022-09-29 ENCOUNTER — HOSPITAL ENCOUNTER (OUTPATIENT)
Dept: GENERAL RADIOLOGY | Facility: SURGERY CENTER | Age: 52
Setting detail: HOSPITAL OUTPATIENT SURGERY
End: 2022-09-29

## 2022-09-29 ENCOUNTER — HOSPITAL ENCOUNTER (OUTPATIENT)
Facility: SURGERY CENTER | Age: 52
Setting detail: HOSPITAL OUTPATIENT SURGERY
Discharge: HOME | End: 2022-09-29
Attending: ANESTHESIOLOGY | Admitting: ANESTHESIOLOGY

## 2022-09-29 VITALS
DIASTOLIC BLOOD PRESSURE: 95 MMHG | OXYGEN SATURATION: 97 % | RESPIRATION RATE: 20 BRPM | SYSTOLIC BLOOD PRESSURE: 135 MMHG | HEART RATE: 67 BPM | TEMPERATURE: 97.7 F | WEIGHT: 228 LBS | BODY MASS INDEX: 32.64 KG/M2 | HEIGHT: 70 IN

## 2022-09-29 DIAGNOSIS — M47.816 LUMBAR FACET ARTHROPATHY: ICD-10-CM

## 2022-09-29 PROCEDURE — 25010000002 FENTANYL CITRATE (PF) 50 MCG/ML SOLUTION: Performed by: ANESTHESIOLOGY

## 2022-09-29 PROCEDURE — 64635 DESTROY LUMB/SAC FACET JNT: CPT | Performed by: ANESTHESIOLOGY

## 2022-09-29 PROCEDURE — 25010000002 MIDAZOLAM PER 1 MG: Performed by: ANESTHESIOLOGY

## 2022-09-29 PROCEDURE — 99152 MOD SED SAME PHYS/QHP 5/>YRS: CPT | Performed by: ANESTHESIOLOGY

## 2022-09-29 PROCEDURE — 76000 FLUOROSCOPY <1 HR PHYS/QHP: CPT

## 2022-09-29 PROCEDURE — 64636 DESTROY L/S FACET JNT ADDL: CPT | Performed by: ANESTHESIOLOGY

## 2022-09-29 PROCEDURE — S0260 H&P FOR SURGERY: HCPCS | Performed by: ANESTHESIOLOGY

## 2022-09-29 RX ORDER — FENTANYL CITRATE 50 UG/ML
INJECTION, SOLUTION INTRAMUSCULAR; INTRAVENOUS AS NEEDED
Status: DISCONTINUED | OUTPATIENT
Start: 2022-09-29 | End: 2022-09-29 | Stop reason: HOSPADM

## 2022-09-29 RX ORDER — MIDAZOLAM HYDROCHLORIDE 1 MG/ML
INJECTION INTRAMUSCULAR; INTRAVENOUS AS NEEDED
Status: DISCONTINUED | OUTPATIENT
Start: 2022-09-29 | End: 2022-09-29 | Stop reason: HOSPADM

## 2022-09-29 NOTE — OP NOTE
Bilateral L3-5 Lumbar Medial Branch RADIOFREQUENCY  [facility]      PREOPERATIVE DIAGNOSIS:  Lumbar spondylosis without myelopathy    POSTOPERATIVE DIAGNOSIS:  Lumbar spondylosis without myelopathy    PROCEDURE:   Diagnostic Bilateral Lumbar Medial Branch Nerve thermal radiofrequency lesioning, with fluoroscopy:  L3, L4, and L5 nerves (at the L4 and L5 transverse processes and the sacral alar groove) to thermally treat the innervation to facet joints L4-5 and L5-S1  1. 30097-07 -- Bilateral L/S facet neuro destr., 1st Level  2. 89194-66 -- Bilateral L/S facet neuro destr., 2nd  Level    PRE-PROCEDURE DISCUSSION WITH PATIENT:    Risks and complications were discussed with the patient prior to starting the procedure and informed consent was obtained.      SURGEON:  Onesimo Patino MD    REASON FOR PROCEDURE:    The patient complains of pain that seems to have a significant axial component and Previous clinically significant therapeutic effect after prior Radiofrequency procedure    SEDATION:  Versed 4mg & Fentanyl 100 mcg IV -he had some increased pain with prone positioning and significant muscle spasm needing the muscle relaxant comfort care and pain control for the procedure.  He was alert and conversational throughout the procedure and not sedated in any way.    TIME OF PROCEDURE:   The intraoperative procedure time after administration of the sedative was 18 minutes.     ANESTHETIC:  Lidocaine 2%  STEROID:  NONE      DESCRIPTON OF PROCEDURE:  After obtaining informed consent, IV access  was obtained in the preoperative area.   The patient was taken to the operating room.  The patient was placed in the prone position with a pillow under the abdomen. All pressure points were well padded.  EKG, blood pressure, and pulse oximeter were monitored.  The patient was monitored and sedated by the RN under my direction. The lumbosacral area was prepped with Chloraprep and draped in a sterile fashion.      Under fluoroscopic guidance the transverse processes of the L4 and L5 vertebrae at the junctions of the superior articular processes were identified on the right.  Also identified was the groove between the ala and the superior articular process of the sacrum on the ipsilateral side.  Skin and subcutaneous tissue were anesthetized with 1ml of 1% lidocaine above each of these points. Then, radiofrequency probe needles were advanced in this fluoro view to the above junctions.  Aspiration was negative for blood and CSF.  After confirming the position of the needle with fluoroscope in all views, testing was initiated.  First, sensory testing was started on each needle a 1V and 50Hz and slowly decreased until painful pressure stimulation diminished at 0.5V.  Next, motor testing was confirmed to be negative at 3V and 2Hz for any radicular stimulation.  Then 1mL of the local anesthetic was instilled in each needle.  Two minutes elapsed, and during this time a lateral fluoroscopic view was confirmed again to ensure the needles had not advanced nor retracted.  Then, Radiofrequency Lesioning was initiated for 1.5 minutes at 85 degrees Celsius.  Needles were removed intact from each of the areas.     A similar procedure was repeated to address the L3, L4, and L5 nerves on the contralateral side.   Onset of analgesia was noted.  Vital signs remained stable throughout.      ESTIMATED BLOOD LOSS:  <5 mL  SPECIMENS:  none    COMPLICATIONS:   No complications were noted., There was not any evidence of dural puncture.   and The patient did not have any signs of postprocedure numbness nor weakness.    TOLERANCE & DISCHARGE CONDITION:    The patient tolerated the procedure well.  The patient was transported to the recovery area without difficulties.  The patient was discharged to home under the care of family in stable and satisfactory condition.    PLAN OF CARE:  1. The patient was given our standard instruction sheet.  2. The  patient will  Return to clinic 6 wks.  3. The patient will resume all medications as per the medication reconciliation sheet.

## 2022-09-29 NOTE — H&P
Brief Pre-procedural / Pre-operative H&P        -----    Pertinent Diagnosis:   Lumbar facet arthropathy/lumbar spondylosis    Proposed Procedure: Lumbar medial branch radiofrequency ablation      Subjective   Aldair Martinez is a 51 y.o. male  who presents for intervention.  He has a history of neck pain.      History of Present Illness     Very pleasant 51-year-old young man.  Significant back pain.  He has a very physically laborious job.  He gets significant sustained relief from radiofrequency ablation.  I have performed radiofrequency ablation on him 4 times previously and he gets 75% pain relief for 6 months.  Recurrent pain that so allowing him to maintain his physical activities and remain nonsurgical.    -------    The following portions of the patient's history were reviewed and updated as appropriate: allergies, current medications, past family history, past medical history, past social history, past surgical history and problem list.    Allergies   Allergen Reactions   • Penicillins Hives and Swelling     Throat swelling       No current facility-administered medications for this encounter.    No current facility-administered medications on file prior to encounter.     Current Outpatient Medications on File Prior to Encounter   Medication Sig Dispense Refill   • cyclobenzaprine (FLEXERIL) 5 MG tablet Take 1 tablet by mouth 2 (Two) Times a Day As Needed for Muscle Spasms. 60 tablet 0   • lovastatin (MEVACOR) 40 MG tablet Take 40 mg by mouth.         Patient Active Problem List   Diagnosis   • Other chronic pain   • Lumbar facet arthropathy   • Chronic bilateral low back pain without sciatica       Past Medical History:   Diagnosis Date   • Carpal tunnel syndrome    • Environmental allergies    • Hyperlipidemia    • Umbilical hernia        Past Surgical History:   Procedure Laterality Date   • HAND SURGERY Right    • HAND SURGERY Left    • RADIOFREQUENCY ABLATION Bilateral 8/12/2021    Procedure:  "Bilatearl L3-L5 RADIOFREQUENCY ABLATION LUMBAR;  Surgeon: Onesimo Patino MD;  Location: [facility];  Service: Pain Management;  Laterality: Bilateral;   • RADIOFREQUENCY ABLATION Bilateral 3/29/2022    Procedure: Bilateral L3-L5 RADIOFREQUENCY ABLATION LUMBAR;  Surgeon: Onesimo Patino MD;  Location: [facility];  Service: Pain Management;  Laterality: Bilateral;   • UMBILICAL HERNIA REPAIR         Family History   Problem Relation Age of Onset   • No Known Problems Mother    • No Known Problems Father        Social History     Socioeconomic History   • Marital status:    Tobacco Use   • Smoking status: Never Smoker   • Smokeless tobacco: Never Used   Vaping Use   • Vaping Use: Never used   Substance and Sexual Activity   • Alcohol use: No   • Drug use: No   • Sexual activity: Defer       -------       Review of Systems  No Fever, No Chills, No ear pain, No sinus pressure or drainage, No eye pain or drainage, No cough, No SOB, No chest tightness nor chest pain, no palpitations.          Vitals:    09/28/22 1407 09/29/22 0806   BP:  143/85   BP Location:  Left arm   Patient Position:  Lying   Pulse:  64   Resp:  20   Temp:  98.4 °F (36.9 °C)   TempSrc:  Temporal   SpO2:  96%   Weight: 103 kg (228 lb)    Height: 177.8 cm (70\")          Objective   Physical Exam  VSS, NNR, NCAT, NMNA, NRD, AAOx3.      -------    Assessment & Plan:  - as noted above, the stated intervention is indicated  - Follow-up plan will be noted in the operative report        Has office in about 6 weeks      EMR Dragon/Transcription disclaimer:   Typed items in this encounter note may have been created by electronic transcription/translation software which converts spoken language to printed text. The electronic translation of spoken language may permit erroneous, or at times, nonsensical words or phrases to be inadvertently transcribed; Although I have reviewed the note for such errors, some may still exist.      "

## 2022-09-29 NOTE — DISCHARGE INSTRUCTIONS
[facility] - Post-procedure Instructions          --  While there are no absolute restrictions, it is recommended that you do not perform strenuous activity today. In the morning, you may resume your level of activity as before your block.    --  If you have a band-aid at your injection site, please remove it later today. Observe the area for any redness, swelling, pus-like drainage, or a temperature over 101°. If any of these symptoms occur, please call your doctor at [phone]. If after office hours, leave a message and the on-call provider will return your call.    --  Ice may be applied to your injection site. It is recommended you avoid direct heat (heating pad; hot tub) for 1-2 days.    --  Call [facility] at [phone] if you experience persistent headache, persistent bleeding from the injection site, or severe pain not relieved by heat or oral medication.    --  Do not make important decisions today.    --  Due to the effects of the block and/or the I.V. Sedation, DO NOT drive or operate hazardous machinery for 12 hours.  Local anesthetics may cause numbness after procedure and precautions must be taken with regards to operating equipment as well as with walking, even if ambulating with assistance of another person or with an assistive device.    --  Do not drink alcohol for 12 hours.    -- You may return to work tomorrow, or as directed by your referring doctor.    --  Occasionally you may notice a slight increase in your pain after the procedure. This should start to improve within the next 24-48 hours. Radiofrequency ablation procedure pain may last 3-4 weeks.    --  It may take as long as 3-4 days before you notice a gradual improvement in your pain and/or other symptoms.    -- You may continue to take your prescribed pain medication as needed.    --  Some normal possible side effects of steroid use could include fluid retention, increased blood sugar, dull headache,  increased sweating, increased appetite, mood swings and flushing.    --  Diabetics are recommended to watch their blood glucose level closely for 24-48 hours after the injection.    --  Must stay in PACU for 20 min upon arrival and prove no leg weakness before being discharged.    --  IN THE EVENT OF A LIFE THREATENING EMERGENCY, (CHEST PAIN, BREATHING DIFFICULTIES, PARALYSIS…) YOU SHOULD GO TO YOUR NEAREST EMERGENCY ROOM.    --  You should be contacted by our office within 2-3 days to schedule follow up or next appointment date.  If not contacted within 7 days, please call the office at [phone]

## 2022-11-11 ENCOUNTER — PREP FOR SURGERY (OUTPATIENT)
Dept: SURGERY | Facility: SURGERY CENTER | Age: 52
End: 2022-11-11

## 2022-11-11 ENCOUNTER — OFFICE VISIT (OUTPATIENT)
Dept: PAIN MEDICINE | Facility: CLINIC | Age: 52
End: 2022-11-11

## 2022-11-11 VITALS
DIASTOLIC BLOOD PRESSURE: 72 MMHG | WEIGHT: 230 LBS | BODY MASS INDEX: 32.93 KG/M2 | HEIGHT: 70 IN | TEMPERATURE: 97.5 F | HEART RATE: 80 BPM | SYSTOLIC BLOOD PRESSURE: 111 MMHG | OXYGEN SATURATION: 96 % | RESPIRATION RATE: 12 BRPM

## 2022-11-11 DIAGNOSIS — M47.816 LUMBAR FACET ARTHROPATHY: ICD-10-CM

## 2022-11-11 DIAGNOSIS — M54.50 CHRONIC BILATERAL LOW BACK PAIN WITHOUT SCIATICA: Primary | ICD-10-CM

## 2022-11-11 DIAGNOSIS — G89.29 OTHER CHRONIC PAIN: ICD-10-CM

## 2022-11-11 DIAGNOSIS — G89.29 CHRONIC BILATERAL LOW BACK PAIN WITHOUT SCIATICA: Primary | ICD-10-CM

## 2022-11-11 PROCEDURE — 99213 OFFICE O/P EST LOW 20 MIN: CPT | Performed by: NURSE PRACTITIONER

## 2022-11-11 RX ORDER — SODIUM CHLORIDE 0.9 % (FLUSH) 0.9 %
10 SYRINGE (ML) INJECTION EVERY 12 HOURS SCHEDULED
Status: CANCELLED | OUTPATIENT
Start: 2022-11-11

## 2022-11-11 RX ORDER — SODIUM CHLORIDE 0.9 % (FLUSH) 0.9 %
10 SYRINGE (ML) INJECTION AS NEEDED
Status: CANCELLED | OUTPATIENT
Start: 2022-11-11

## 2022-11-11 RX ORDER — CYCLOBENZAPRINE HCL 5 MG
5 TABLET ORAL 2 TIMES DAILY PRN
Qty: 60 TABLET | Refills: 1 | Status: SHIPPED | OUTPATIENT
Start: 2022-11-11 | End: 2023-03-28 | Stop reason: SDUPTHER

## 2022-11-11 NOTE — PROGRESS NOTES
CHIEF COMPLAINT  PROCEDURE FOLLOW UP Bilateral L3-L5 RADIOFREQUENCY ABLATION LUMBAR 09/29/2022  Patient in office reports 80% pain relief from RFA with continuous ongoing effect.       Subjective   Aldair Martinez is a 51 y.o. male  who presents to the office for follow-up of procedure.  He completed a Bialteral L3-L5 RFA   on  9/29/2022 performed by Dr. Patino for management of back pain. Patient reports 80% ONGOING relief from the procedure.     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 He is currently in his busy season. He does utilize flexeril 5 mg PRN. This is helpful to his pain without side effect.      Procedure list:  3/29/2022-bilateral L3-L5 RFA-80% relief  8/12/2021-bilateral L3-L5 RFA-65 to 75% relief  12/31/2020-bilateral L3-L5 RFA-60 to 70% relief  11/3/2020-bilateral L3-L5 MBB-80 to 85% diagnostic relief  6/1/2020-bilateral L3-L5 RFA-75 to 80% relief  1/15/2020-bilateral L3-L5 MBB-80% diagnostic relief  12/12/2019-bilateral L3-L5 MBB-80% diagnostic relief    Back Pain  This is a chronic problem. The current episode started more than 1 year ago. The problem occurs daily. Progression since onset: improved since last office visit. The pain is present in the lumbar spine. The quality of the pain is described as aching and burning. The pain does not radiate. The pain is at a severity of 4/10. The pain is moderate. The symptoms are aggravated by position, sitting and lying down. Pertinent negatives include no abdominal pain, chest pain, dysuria, fever, headaches, numbness or weakness. He has tried home exercises (PT, RFL) for the symptoms. The treatment provided significant (RFL) relief.      PEG Assessment   What number best describes your pain on average in the past week?5  What number best describes how, during the past week, pain has interfered with your enjoyment of life?0  What number best describes how, during the past week, pain has interfered with your  "general activity?  0    The following portions of the patient's history were reviewed and updated as appropriate: allergies, current medications, past family history, past medical history, past social history, past surgical history and problem list.    Review of Systems   Constitutional: Negative for activity change, fatigue and fever.   HENT: Negative for congestion.    Eyes: Negative for visual disturbance.   Respiratory: Negative for cough and chest tightness.    Cardiovascular: Negative for chest pain.   Gastrointestinal: Negative for abdominal pain, constipation and diarrhea.   Genitourinary: Negative for difficulty urinating and dysuria.   Musculoskeletal: Positive for back pain.   Neurological: Negative for dizziness, weakness, light-headedness, numbness and headaches.   Psychiatric/Behavioral: Positive for sleep disturbance. Negative for agitation and suicidal ideas. The patient is not nervous/anxious.      --  The aforementioned information the Chief Complaint section and above subjective data including any HPI data, and also the Review of Systems data, has been personally reviewed and affirmed.  --     Vitals:    11/11/22 1232   BP: 111/72   BP Location: Right arm   Patient Position: Sitting   Cuff Size: Large Adult   Pulse: 80   Resp: 12   Temp: 97.5 °F (36.4 °C)   TempSrc: Temporal   SpO2: 96%   Weight: 104 kg (230 lb)   Height: 177.8 cm (70\")   PainSc:   4   PainLoc: Comment: BACK     Objective   Physical Exam  Vitals and nursing note reviewed.   Constitutional:       Appearance: Normal appearance. He is well-developed.   Eyes:      General: Lids are normal.   Cardiovascular:      Rate and Rhythm: Normal rate.   Pulmonary:      Effort: Pulmonary effort is normal.   Musculoskeletal:      Lumbar back: Decreased range of motion.   Neurological:      Mental Status: He is alert and oriented to person, place, and time.   Psychiatric:         Attention and Perception: Attention normal.         Mood and Affect: " Mood normal.         Speech: Speech normal.         Behavior: Behavior normal.         Judgment: Judgment normal.       Assessment & Plan   Diagnoses and all orders for this visit:    1. Chronic bilateral low back pain without sciatica (Primary)    2. Lumbar facet arthropathy    3. Other chronic pain    Other orders  -     cyclobenzaprine (FLEXERIL) 5 MG tablet; Take 1 tablet by mouth 2 (Two) Times a Day As Needed for Muscle Spasms.  Dispense: 60 tablet; Refill: 1      --- Refill Flexeril 5 mg PRN  --- Plan to repeat Bilateral L3-L5 RFA with Dr. Patino after March 29, 2023. Will plan for an office visit a few weeks prior to his procedure.   Reviewed the procedure at length with the patient.  Included in the review was expectations, complications, risk and benefits.The procedure was described in detail and the risks, benefits and alternatives were discussed with the patient (including but not limited to: bleeding, infection, nerve damage, worsening of pain, inability to perform injection, paralysis, seizures, coma, no pain relief and death) who agreed to proceed.  Discussed the potential for sedation if warranted/wanted.  The procedure will plan to be performed at [facility] with fluoroscopic guidance(unless ultrasound is indicated) and could potentially have steroids and contrast dye used. Questions were answered and in a way the patient could understand.  Patient verbalized understanding and wishes to proceed.  This intervention will be ordered.  Discussed with patient that all procedures are part of a multimodal plan of care and include either formal PT or a home exercise program.  Patient has no evidence of coagulopathy or current infection.    --- Follow-up 4 months or sooner if needed.      Dictated utilizing Dragon dictation.      Patient remained masked during entire encounter. No cough present. I donned a mask and eye protection throughout entire visit. Prior to donning mask and eye  protection, hand hygiene was performed, as well as when it was doffed.  I was closer than 6 feet, but not for an extended period of time. No obvious exposure to any bodily fluids.

## 2023-02-28 ENCOUNTER — TRANSCRIBE ORDERS (OUTPATIENT)
Dept: SURGERY | Facility: SURGERY CENTER | Age: 53
End: 2023-02-28
Payer: COMMERCIAL

## 2023-02-28 DIAGNOSIS — Z41.9 SURGERY, ELECTIVE: Primary | ICD-10-CM

## 2023-03-28 ENCOUNTER — OFFICE VISIT (OUTPATIENT)
Dept: PAIN MEDICINE | Facility: CLINIC | Age: 53
End: 2023-03-28
Payer: COMMERCIAL

## 2023-03-28 VITALS
HEIGHT: 70 IN | DIASTOLIC BLOOD PRESSURE: 82 MMHG | SYSTOLIC BLOOD PRESSURE: 120 MMHG | RESPIRATION RATE: 18 BRPM | TEMPERATURE: 97.1 F | WEIGHT: 231.8 LBS | BODY MASS INDEX: 33.18 KG/M2 | HEART RATE: 65 BPM | OXYGEN SATURATION: 94 %

## 2023-03-28 DIAGNOSIS — G89.29 OTHER CHRONIC PAIN: Primary | ICD-10-CM

## 2023-03-28 DIAGNOSIS — M54.50 CHRONIC BILATERAL LOW BACK PAIN WITHOUT SCIATICA: ICD-10-CM

## 2023-03-28 DIAGNOSIS — M47.816 LUMBAR FACET ARTHROPATHY: ICD-10-CM

## 2023-03-28 DIAGNOSIS — G89.29 CHRONIC BILATERAL LOW BACK PAIN WITHOUT SCIATICA: ICD-10-CM

## 2023-03-28 PROCEDURE — 99214 OFFICE O/P EST MOD 30 MIN: CPT | Performed by: NURSE PRACTITIONER

## 2023-03-28 RX ORDER — CYCLOBENZAPRINE HCL 5 MG
5 TABLET ORAL 2 TIMES DAILY PRN
Qty: 60 TABLET | Refills: 1 | Status: SHIPPED | OUTPATIENT
Start: 2023-03-28

## 2023-03-28 NOTE — PROGRESS NOTES
CHIEF COMPLAINT  Back pain    Subjective   Aldair Martinez is a 52 y.o. male  who presents for follow-up.  He has a history of back pain.  Today his pain is 6/10VAS in severity. His pain remains in his midline low back that radiates axially across his belt line. He describes this as aching, burning, sharp, and shooting pain. This is worsened with work. He utilizes flexeril 5 mg that he takes PRN. He denies any side effects including somnolence.     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     Procedure list:  9/29/2022-bilateral L3-L5 RFA-80% relief with 50% relief lasting 6 months. His pain is now returning.   3/29/2022-bilateral L3-L5 RFA-80% relief  8/12/2021-bilateral L3-L5 RFA-65 to 75% relief  12/31/2020-bilateral L3-L5 RFA-60 to 70% relief  11/3/2020-bilateral L3-L5 MBB-80 to 85% diagnostic relief  6/1/2020-bilateral L3-L5 RFA-75 to 80% relief  1/15/2020-bilateral L3-L5 MBB-80% diagnostic relief  12/12/2019-bilateral L3-L5 MBB-80% diagnostic relief    Back Pain  This is a chronic problem. The current episode started more than 1 year ago. The problem occurs daily. The problem has been gradually worsening since onset. The pain is present in the lumbar spine. The quality of the pain is described as aching and burning. The pain does not radiate. The pain is at a severity of 6/10. The pain is moderate. The symptoms are aggravated by position, sitting and lying down. Pertinent negatives include no abdominal pain, chest pain, dysuria, fever, headaches, numbness or weakness. He has tried home exercises (PT, RFL) for the symptoms. The treatment provided significant (RFL) relief.      PEG Assessment   What number best describes your pain on average in the past week?6  What number best describes how, during the past week, pain has interfered with your enjoyment of life?0  What number best describes how, during the past week, pain has interfered with your general activity?  0    The following portions of the  "patient's history were reviewed and updated as appropriate: allergies, current medications, past family history, past medical history, past social history, past surgical history and problem list.    Review of Systems   Constitutional: Negative for activity change, fatigue and fever.   HENT: Negative for congestion.    Eyes: Negative for visual disturbance.   Respiratory: Negative for cough and chest tightness.    Cardiovascular: Negative for chest pain.   Gastrointestinal: Negative for abdominal pain, constipation and diarrhea.   Genitourinary: Negative for difficulty urinating and dysuria.   Musculoskeletal: Positive for back pain.   Neurological: Negative for dizziness, weakness, light-headedness, numbness and headaches.   Psychiatric/Behavioral: Positive for sleep disturbance. Negative for agitation and suicidal ideas. The patient is not nervous/anxious.      --  The aforementioned information the Chief Complaint section and above subjective data including any HPI data, and also the Review of Systems data, has been personally reviewed and affirmed.  --    Vitals:    03/28/23 0836   BP: 120/82   BP Location: Left arm   Patient Position: Sitting   Cuff Size: Large Adult   Pulse: 65   Resp: 18   Temp: 97.1 °F (36.2 °C)   TempSrc: Temporal   SpO2: 94%   Weight: 105 kg (231 lb 12.8 oz)   Height: 177.8 cm (70\")   PainSc:   6     Objective   Physical Exam  Vitals and nursing note reviewed.   Constitutional:       Appearance: Normal appearance. He is well-developed.   Eyes:      General: Lids are normal.   Cardiovascular:      Rate and Rhythm: Normal rate.   Pulmonary:      Effort: Pulmonary effort is normal.   Musculoskeletal:      Lumbar back: Tenderness and bony tenderness present. Decreased range of motion.      Comments:   +lumbar facet loading   Neurological:      Mental Status: He is alert and oriented to person, place, and time.   Psychiatric:         Attention and Perception: Attention normal.         Mood and " Affect: Mood normal.         Speech: Speech normal.         Behavior: Behavior normal.         Judgment: Judgment normal.       Assessment & Plan   Diagnoses and all orders for this visit:    1. Other chronic pain (Primary)    2. Lumbar facet arthropathy    3. Chronic bilateral low back pain without sciatica    Other orders  -     cyclobenzaprine (FLEXERIL) 5 MG tablet; Take 1 tablet by mouth 2 (Two) Times a Day As Needed for Muscle Spasms.  Dispense: 60 tablet; Refill: 1      Thermal Radiofrequency Destruction    This repeat thermal radiofrequency destruction (RFA) of cervical, thoracic, or lumbar paravertebral facet joint (medial branch) nerves at the same anatomical site is considered medically reasonable and necessary as this patient has met the criteria of having a minimum of consistent 50% improvement in pain for at least six (6) months or at least 50% consistent improvement in the ability to perform previously painful movements and ADLs as compared to baseline measurement using the same scale.    Pain / Disability Scale    The scale used for measurement of pain and/or disability for this patient was the [place] back pain disability scale.  The score was 31 on 03/28/2023    --- Proceed with repeat Bilateral L3-L5 RFA as this provides significant relief lasting 6 months which allows him to continue to work.   Reviewed the procedure at length with the patient.  Included in the review was expectations, complications, risk and benefits.The procedure was described in detail and the risks, benefits and alternatives were discussed with the patient (including but not limited to: bleeding, infection, nerve damage, worsening of pain, inability to perform injection, paralysis, seizures, coma, no pain relief and death) who agreed to proceed.  Discussed the potential for sedation if warranted/wanted.  The procedure will plan to be performed at [facility] with fluoroscopic guidance(unless ultrasound is  indicated) and could potentially have steroids and contrast dye used. Questions were answered and in a way the patient could understand.  Patient verbalized understanding and wishes to proceed.  This intervention will be ordered.  Discussed with patient that all procedures are part of a multimodal plan of care and include either formal PT or a home exercise program.  Patient has no evidence of coagulopathy or current infection.  --- Continue Flexeril PRN. Refill provided today.   --- Follow-up after procedure     Dictated utilizing Dragon dictation.     Patient remained masked during entire encounter. No cough present. I donned a mask and eye protection throughout entire visit. Prior to donning mask and eye protection, hand hygiene was performed, as well as when it was doffed.  I was closer than 6 feet, but not for an extended period of time. No obvious exposure to any bodily fluids.

## 2023-03-28 NOTE — H&P (VIEW-ONLY)
CHIEF COMPLAINT  Back pain    Subjective   Aldair Martinez is a 52 y.o. male  who presents for follow-up.  He has a history of back pain.  Today his pain is 6/10VAS in severity. His pain remains in his midline low back that radiates axially across his belt line. He describes this as aching, burning, sharp, and shooting pain. This is worsened with work. He utilizes flexeril 5 mg that he takes PRN. He denies any side effects including somnolence.     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     Procedure list:  9/29/2022-bilateral L3-L5 RFA-80% relief with 50% relief lasting 6 months. His pain is now returning.   3/29/2022-bilateral L3-L5 RFA-80% relief  8/12/2021-bilateral L3-L5 RFA-65 to 75% relief  12/31/2020-bilateral L3-L5 RFA-60 to 70% relief  11/3/2020-bilateral L3-L5 MBB-80 to 85% diagnostic relief  6/1/2020-bilateral L3-L5 RFA-75 to 80% relief  1/15/2020-bilateral L3-L5 MBB-80% diagnostic relief  12/12/2019-bilateral L3-L5 MBB-80% diagnostic relief    Back Pain  This is a chronic problem. The current episode started more than 1 year ago. The problem occurs daily. The problem has been gradually worsening since onset. The pain is present in the lumbar spine. The quality of the pain is described as aching and burning. The pain does not radiate. The pain is at a severity of 6/10. The pain is moderate. The symptoms are aggravated by position, sitting and lying down. Pertinent negatives include no abdominal pain, chest pain, dysuria, fever, headaches, numbness or weakness. He has tried home exercises (PT, RFL) for the symptoms. The treatment provided significant (RFL) relief.      PEG Assessment   What number best describes your pain on average in the past week?6  What number best describes how, during the past week, pain has interfered with your enjoyment of life?0  What number best describes how, during the past week, pain has interfered with your general activity?  0    The following portions of the  "patient's history were reviewed and updated as appropriate: allergies, current medications, past family history, past medical history, past social history, past surgical history and problem list.    Review of Systems   Constitutional: Negative for activity change, fatigue and fever.   HENT: Negative for congestion.    Eyes: Negative for visual disturbance.   Respiratory: Negative for cough and chest tightness.    Cardiovascular: Negative for chest pain.   Gastrointestinal: Negative for abdominal pain, constipation and diarrhea.   Genitourinary: Negative for difficulty urinating and dysuria.   Musculoskeletal: Positive for back pain.   Neurological: Negative for dizziness, weakness, light-headedness, numbness and headaches.   Psychiatric/Behavioral: Positive for sleep disturbance. Negative for agitation and suicidal ideas. The patient is not nervous/anxious.      --  The aforementioned information the Chief Complaint section and above subjective data including any HPI data, and also the Review of Systems data, has been personally reviewed and affirmed.  --    Vitals:    03/28/23 0836   BP: 120/82   BP Location: Left arm   Patient Position: Sitting   Cuff Size: Large Adult   Pulse: 65   Resp: 18   Temp: 97.1 °F (36.2 °C)   TempSrc: Temporal   SpO2: 94%   Weight: 105 kg (231 lb 12.8 oz)   Height: 177.8 cm (70\")   PainSc:   6     Objective   Physical Exam  Vitals and nursing note reviewed.   Constitutional:       Appearance: Normal appearance. He is well-developed.   Eyes:      General: Lids are normal.   Cardiovascular:      Rate and Rhythm: Normal rate.   Pulmonary:      Effort: Pulmonary effort is normal.   Musculoskeletal:      Lumbar back: Tenderness and bony tenderness present. Decreased range of motion.      Comments:   +lumbar facet loading   Neurological:      Mental Status: He is alert and oriented to person, place, and time.   Psychiatric:         Attention and Perception: Attention normal.         Mood and " Affect: Mood normal.         Speech: Speech normal.         Behavior: Behavior normal.         Judgment: Judgment normal.       Assessment & Plan   Diagnoses and all orders for this visit:    1. Other chronic pain (Primary)    2. Lumbar facet arthropathy    3. Chronic bilateral low back pain without sciatica    Other orders  -     cyclobenzaprine (FLEXERIL) 5 MG tablet; Take 1 tablet by mouth 2 (Two) Times a Day As Needed for Muscle Spasms.  Dispense: 60 tablet; Refill: 1      Thermal Radiofrequency Destruction    This repeat thermal radiofrequency destruction (RFA) of cervical, thoracic, or lumbar paravertebral facet joint (medial branch) nerves at the same anatomical site is considered medically reasonable and necessary as this patient has met the criteria of having a minimum of consistent 50% improvement in pain for at least six (6) months or at least 50% consistent improvement in the ability to perform previously painful movements and ADLs as compared to baseline measurement using the same scale.    Pain / Disability Scale    The scale used for measurement of pain and/or disability for this patient was the [place] back pain disability scale.  The score was 31 on 03/28/2023    --- Proceed with repeat Bilateral L3-L5 RFA as this provides significant relief lasting 6 months which allows him to continue to work.   Reviewed the procedure at length with the patient.  Included in the review was expectations, complications, risk and benefits.The procedure was described in detail and the risks, benefits and alternatives were discussed with the patient (including but not limited to: bleeding, infection, nerve damage, worsening of pain, inability to perform injection, paralysis, seizures, coma, no pain relief and death) who agreed to proceed.  Discussed the potential for sedation if warranted/wanted.  The procedure will plan to be performed at [facility] with fluoroscopic guidance(unless ultrasound is  indicated) and could potentially have steroids and contrast dye used. Questions were answered and in a way the patient could understand.  Patient verbalized understanding and wishes to proceed.  This intervention will be ordered.  Discussed with patient that all procedures are part of a multimodal plan of care and include either formal PT or a home exercise program.  Patient has no evidence of coagulopathy or current infection.  --- Continue Flexeril PRN. Refill provided today.   --- Follow-up after procedure     Dictated utilizing Dragon dictation.     Patient remained masked during entire encounter. No cough present. I donned a mask and eye protection throughout entire visit. Prior to donning mask and eye protection, hand hygiene was performed, as well as when it was doffed.  I was closer than 6 feet, but not for an extended period of time. No obvious exposure to any bodily fluids.

## 2023-03-28 NOTE — SIGNIFICANT NOTE
Patient educated on the following :    - If you are receiving Sedation for your procedure Nothing to Eat 6 hours and only clear liquids for 2 hours prior to your procedure.    -You will need to have someone drive you home after your PROCEDURE and remain with you for 24 hours after the PROCEDURE  - The date of your procedure, your are welcome to have one visitor at bedside or remain within 10-15 minutes of [facility]  -You will need to arrive at 0715 on 3/30/23 PROCEDURE  -Please contact [facility] at: [phone] with any questions and/or concerns

## 2023-03-30 ENCOUNTER — HOSPITAL ENCOUNTER (OUTPATIENT)
Facility: SURGERY CENTER | Age: 53
Setting detail: HOSPITAL OUTPATIENT SURGERY
Discharge: HOME | End: 2023-03-30
Attending: ANESTHESIOLOGY | Admitting: ANESTHESIOLOGY
Payer: COMMERCIAL

## 2023-03-30 ENCOUNTER — HOSPITAL ENCOUNTER (OUTPATIENT)
Dept: GENERAL RADIOLOGY | Facility: SURGERY CENTER | Age: 53
Setting detail: HOSPITAL OUTPATIENT SURGERY
End: 2023-03-30
Payer: COMMERCIAL

## 2023-03-30 VITALS
RESPIRATION RATE: 16 BRPM | DIASTOLIC BLOOD PRESSURE: 94 MMHG | OXYGEN SATURATION: 94 % | HEART RATE: 69 BPM | TEMPERATURE: 98 F | BODY MASS INDEX: 32.93 KG/M2 | HEIGHT: 70 IN | SYSTOLIC BLOOD PRESSURE: 132 MMHG | WEIGHT: 230 LBS

## 2023-03-30 DIAGNOSIS — G89.29 CHRONIC BILATERAL LOW BACK PAIN WITHOUT SCIATICA: ICD-10-CM

## 2023-03-30 DIAGNOSIS — M54.50 CHRONIC BILATERAL LOW BACK PAIN WITHOUT SCIATICA: ICD-10-CM

## 2023-03-30 DIAGNOSIS — M47.816 LUMBAR FACET ARTHROPATHY: ICD-10-CM

## 2023-03-30 DIAGNOSIS — Z41.9 SURGERY, ELECTIVE: ICD-10-CM

## 2023-03-30 PROCEDURE — 25010000002 FENTANYL CITRATE (PF) 50 MCG/ML SOLUTION: Performed by: ANESTHESIOLOGY

## 2023-03-30 PROCEDURE — 64635 DESTROY LUMB/SAC FACET JNT: CPT | Performed by: ANESTHESIOLOGY

## 2023-03-30 PROCEDURE — 99152 MOD SED SAME PHYS/QHP 5/>YRS: CPT | Performed by: ANESTHESIOLOGY

## 2023-03-30 PROCEDURE — 76000 FLUOROSCOPY <1 HR PHYS/QHP: CPT

## 2023-03-30 PROCEDURE — 64636 DESTROY L/S FACET JNT ADDL: CPT | Performed by: ANESTHESIOLOGY

## 2023-03-30 PROCEDURE — 25010000002 MIDAZOLAM PER 1 MG: Performed by: ANESTHESIOLOGY

## 2023-03-30 RX ORDER — SODIUM CHLORIDE 0.9 % (FLUSH) 0.9 %
10 SYRINGE (ML) INJECTION AS NEEDED
Status: DISCONTINUED | OUTPATIENT
Start: 2023-03-30 | End: 2023-03-30 | Stop reason: HOSPADM

## 2023-03-30 RX ORDER — MIDAZOLAM HYDROCHLORIDE 1 MG/ML
INJECTION INTRAMUSCULAR; INTRAVENOUS AS NEEDED
Status: DISCONTINUED | OUTPATIENT
Start: 2023-03-30 | End: 2023-03-30 | Stop reason: HOSPADM

## 2023-03-30 RX ORDER — SODIUM CHLORIDE 0.9 % (FLUSH) 0.9 %
10 SYRINGE (ML) INJECTION EVERY 12 HOURS SCHEDULED
Status: DISCONTINUED | OUTPATIENT
Start: 2023-03-30 | End: 2023-03-30 | Stop reason: HOSPADM

## 2023-03-30 RX ORDER — FENTANYL CITRATE 50 UG/ML
INJECTION, SOLUTION INTRAMUSCULAR; INTRAVENOUS AS NEEDED
Status: DISCONTINUED | OUTPATIENT
Start: 2023-03-30 | End: 2023-03-30 | Stop reason: HOSPADM

## 2023-03-30 NOTE — OP NOTE
Bilateral L3-5 Lumbar Medial Branch RADIOFREQUENCY  [facility]      PREOPERATIVE DIAGNOSIS:  Lumbar spondylosis without myelopathy    POSTOPERATIVE DIAGNOSIS:  Lumbar spondylosis without myelopathy    PROCEDURE:   Diagnostic Bilateral Lumbar Medial Branch Nerve thermal radiofrequency lesioning, with fluoroscopy:  L3, L4, and L5 nerves (at the L4 and L5 transverse processes and the sacral alar groove) to thermally treat the innervation to facet joints L4-5 and L5-S1  1. 49205-97 -- Bilateral L/S facet neuro destr., 1st Level  2. 92326-47 -- Bilateral L/S facet neuro destr., 2nd  Level    PRE-PROCEDURE DISCUSSION WITH PATIENT:    Risks and complications were discussed with the patient prior to starting the procedure and informed consent was obtained.      SURGEON:  Onesimo Patino MD    REASON FOR PROCEDURE:    The patient complains of pain that seems to have a significant axial component and Previous clinically significant therapeutic effect after prior Radiofrequency procedure    SEDATION:  Versed 6mg & Fentanyl 100 mcg IV  TIME OF PROCEDURE:   The intraoperative procedure time after administration of the sedative was 24 minutes.     ANESTHETIC:  Lidocaine 2%  STEROID:  NONE      DESCRIPTON OF PROCEDURE:  After obtaining informed consent, IV access  was obtained in the preoperative area.   The patient was taken to the operating room.  The patient was placed in the prone position with a pillow under the abdomen. All pressure points were well padded.  EKG, blood pressure, and pulse oximeter were monitored.  The patient was monitored and sedated by the RN under my direction. The lumbosacral area was prepped with Chloraprep and draped in a sterile fashion.     Under fluoroscopic guidance the transverse processes of the L4 and L5 vertebrae at the junctions of the superior articular processes were identified on the right.  Also identified was the groove between the ala and the superior articular  process of the sacrum on the ipsilateral side.  Skin and subcutaneous tissue were anesthetized with 1ml of 1% lidocaine above each of these points. Then, radiofrequency probe needles were advanced in this fluoro view to the above junctions.  Aspiration was negative for blood and CSF.  After confirming the position of the needle with fluoroscope in all views, testing was initiated.  First, sensory testing was started on each needle a 1V and 50Hz and slowly decreased until painful pressure stimulation diminished at 0.5V.  Next, motor testing was confirmed to be negative at 3V and 2Hz for any radicular stimulation.  Then 1mL of the local anesthetic was instilled in each needle.  Two minutes elapsed, and during this time a lateral fluoroscopic view was confirmed again to ensure the needles had not advanced nor retracted.  Then, Radiofrequency Lesioning was initiated for 3 minutes at 80 degrees Celsius.  Needles were removed intact from each of the areas.     A similar procedure was repeated to address the L3, L4, and L5 nerves on the contralateral side.   Onset of analgesia was noted.  Vital signs remained stable throughout.      ESTIMATED BLOOD LOSS:  <5 mL  SPECIMENS:  none    COMPLICATIONS:   No complications were noted. and The patient did not have any signs of postprocedure numbness nor weakness.    TOLERANCE & DISCHARGE CONDITION:    The patient tolerated the procedure well.  The patient was transported to the recovery area without difficulties.  The patient was discharged to home under the care of family in stable and satisfactory condition.    PLAN OF CARE:  1. The patient was given our standard instruction sheet.  2. The patient will  Return to clinic 4-6 wks.  3. The patient will resume all medications as per the medication reconciliation sheet.

## 2023-03-30 NOTE — DISCHARGE INSTRUCTIONS
[facility] - Post-procedure Instructions          --  While there are no absolute restrictions, it is recommended that you do not perform strenuous activity today. In the morning, you may resume your level of activity as before your block.    --  If you have a band-aid at your injection site, please remove it later today. Observe the area for any redness, swelling, pus-like drainage, or a temperature over 101°. If any of these symptoms occur, please call your doctor at [phone]. If after office hours, leave a message and the on-call provider will return your call.    --  Ice may be applied to your injection site. It is recommended you avoid direct heat (heating pad; hot tub) for 1-2 days.    --  Call [facility] at [phone] if you experience persistent headache, persistent bleeding from the injection site, or severe pain not relieved by heat or oral medication.    --  Do not make important decisions today.    --  Due to the effects of the block and/or the I.V. Sedation, DO NOT drive or operate hazardous machinery for 12 hours.  Local anesthetics may cause numbness after procedure and precautions must be taken with regards to operating equipment as well as with walking, even if ambulating with assistance of another person or with an assistive device.    --  Do not drink alcohol for 12 hours.    -- You may return to work tomorrow, or as directed by your referring doctor.    --  Occasionally you may notice a slight increase in your pain after the procedure. This should start to improve within the next 24-48 hours. Radiofrequency ablation procedure pain may last 3-4 weeks.    --  It may take as long as 3-4 days before you notice a gradual improvement in your pain and/or other symptoms.    -- You may continue to take your prescribed pain medication as needed.    --  Some normal possible side effects of steroid use could include fluid retention, increased blood sugar, dull headache,  increased sweating, increased appetite, mood swings and flushing.    --  Diabetics are recommended to watch their blood glucose level closely for 24-48 hours after the injection.    --  Must stay in PACU for 20 min upon arrival and prove no leg weakness before being discharged.    --  IN THE EVENT OF A LIFE THREATENING EMERGENCY, (CHEST PAIN, BREATHING DIFFICULTIES, PARALYSIS…) YOU SHOULD GO TO YOUR NEAREST EMERGENCY ROOM.    --  You should be contacted by our office within 2-3 days to schedule follow up or next appointment date.  If not contacted within 7 days, please call the office at [phone]

## 2023-05-12 ENCOUNTER — OFFICE VISIT (OUTPATIENT)
Dept: PAIN MEDICINE | Facility: CLINIC | Age: 53
End: 2023-05-12
Payer: COMMERCIAL

## 2023-05-12 VITALS
HEART RATE: 66 BPM | HEIGHT: 70 IN | TEMPERATURE: 97.5 F | DIASTOLIC BLOOD PRESSURE: 81 MMHG | BODY MASS INDEX: 32.99 KG/M2 | WEIGHT: 230.4 LBS | SYSTOLIC BLOOD PRESSURE: 121 MMHG | OXYGEN SATURATION: 96 %

## 2023-05-12 DIAGNOSIS — G89.29 OTHER CHRONIC PAIN: Primary | ICD-10-CM

## 2023-05-12 DIAGNOSIS — M54.50 CHRONIC BILATERAL LOW BACK PAIN WITHOUT SCIATICA: ICD-10-CM

## 2023-05-12 DIAGNOSIS — G89.29 CHRONIC BILATERAL LOW BACK PAIN WITHOUT SCIATICA: ICD-10-CM

## 2023-05-12 DIAGNOSIS — M47.816 LUMBAR FACET ARTHROPATHY: ICD-10-CM

## 2023-05-12 DIAGNOSIS — M62.838 MUSCLE SPASM: ICD-10-CM

## 2023-05-12 PROCEDURE — 99213 OFFICE O/P EST LOW 20 MIN: CPT | Performed by: NURSE PRACTITIONER

## 2023-05-12 RX ORDER — MOMETASONE FUROATE 1 MG/G
CREAM TOPICAL
COMMUNITY
Start: 2023-05-11

## 2023-05-12 RX ORDER — CYCLOBENZAPRINE HCL 5 MG
5 TABLET ORAL 2 TIMES DAILY PRN
Qty: 60 TABLET | Refills: 1 | Status: SHIPPED | OUTPATIENT
Start: 2023-05-12

## 2023-05-12 RX ORDER — HYDROXYZINE PAMOATE 25 MG/1
25 CAPSULE ORAL NIGHTLY
COMMUNITY
Start: 2023-04-12

## 2023-05-12 RX ORDER — BUDESONIDE AND FORMOTEROL FUMARATE DIHYDRATE 80; 4.5 UG/1; UG/1
AEROSOL RESPIRATORY (INHALATION)
COMMUNITY
Start: 2023-05-11

## 2023-05-12 RX ORDER — CETIRIZINE HYDROCHLORIDE 10 MG/1
10 TABLET ORAL DAILY
COMMUNITY
Start: 2023-05-11

## 2023-05-12 NOTE — PROGRESS NOTES
CHIEF COMPLAINT  F/U BACK PAIN- Bilateral L3-L5 RADIOFREQUENCY ABLATION LUMBAR- patient states that he has had 95% improvement since the procedure.     Subjective   Aldair Martinez is a 52 y.o. male  who presents to the office for follow-up of procedure.  He completed a Bilateral L3-L5 RFA   on  3/30/2023 performed by Dr. Dean for management of back pain. Patient reports 95% ONGOING relief from the procedure.     Today his pain is 0/10VAS in severity. He continues to work doing [employer] for [employer]. 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     He does continue to utilize flexeril 5 mg PRN. This is helpful when he experiences muscle spasms in his low back. He denies any side effects including somnolence.      Procedure list:  9/29/2022-bilateral L3-L5 RFA-80% relief with 50% relief lasting 6 months. His pain is now returning.   3/29/2022-bilateral L3-L5 RFA-80% relief  8/12/2021-bilateral L3-L5 RFA-65 to 75% relief  12/31/2020-bilateral L3-L5 RFA-60 to 70% relief  11/3/2020-bilateral L3-L5 MBB-80 to 85% diagnostic relief  6/1/2020-bilateral L3-L5 RFA-75 to 80% relief  1/15/2020-bilateral L3-L5 MBB-80% diagnostic relief  12/12/2019-bilateral L3-L5 MBB-80% diagnostic relief    Back Pain  This is a chronic problem. The current episode started more than 1 year ago. The problem occurs daily. Progression since onset: improved since last office visit. The pain is present in the lumbar spine. The quality of the pain is described as aching and burning. The pain does not radiate. The pain is at a severity of 0/10. The pain is moderate. The symptoms are aggravated by position, sitting and lying down. Pertinent negatives include no abdominal pain, chest pain, dysuria, fever, headaches, numbness or weakness. He has tried home exercises (PT, RFL) for the symptoms. The treatment provided significant (RFL) relief.      PEG Assessment   What number best describes your  "pain on average in the past week?2  What number best describes how, during the past week, pain has interfered with your enjoyment of life?0  What number best describes how, during the past week, pain has interfered with your general activity?  0    The following portions of the patient's history were reviewed and updated as appropriate: allergies, current medications, past family history, past medical history, past social history, past surgical history and problem list.    Review of Systems   Constitutional: Negative for activity change, chills, fatigue and fever.   HENT: Negative for congestion.    Eyes: Negative for visual disturbance.   Respiratory: Negative for chest tightness and shortness of breath.    Cardiovascular: Negative for chest pain.   Gastrointestinal: Negative for abdominal pain, constipation and diarrhea.   Genitourinary: Negative for difficulty urinating and dysuria.   Musculoskeletal: Positive for back pain.   Neurological: Negative for dizziness, weakness, light-headedness, numbness and headaches.   Psychiatric/Behavioral: Negative for agitation and sleep disturbance. The patient is not nervous/anxious.      --  The aforementioned information the Chief Complaint section and above subjective data including any HPI data, and also the Review of Systems data, has been personally reviewed and affirmed.  --     Vitals:    05/12/23 1047   BP: 121/81   Pulse: 66   Temp: 97.5 °F (36.4 °C)   SpO2: 96%   Weight: 105 kg (230 lb 6.4 oz)   Height: 177.8 cm (70\")   PainSc: 0-No pain     Objective   Physical Exam  Vitals and nursing note reviewed.   Constitutional:       Appearance: Normal appearance. He is well-developed.   Eyes:      General: Lids are normal.   Cardiovascular:      Rate and Rhythm: Normal rate.   Pulmonary:      Effort: Pulmonary effort is normal.   Musculoskeletal:      Lumbar back: No tenderness. Normal range of motion.   Neurological:      Mental Status: He is alert and oriented to person, " place, and time.   Psychiatric:         Attention and Perception: Attention normal.         Mood and Affect: Mood normal.         Speech: Speech normal.         Behavior: Behavior normal.         Judgment: Judgment normal.       Assessment & Plan   Diagnoses and all orders for this visit:    1. Other chronic pain (Primary)    2. Lumbar facet arthropathy    3. Chronic bilateral low back pain without sciatica    4. Muscle spasm    Other orders  -     cyclobenzaprine (FLEXERIL) 5 MG tablet; Take 1 tablet by mouth 2 (Two) Times a Day As Needed for Muscle Spasms.  Dispense: 60 tablet; Refill: 1      --- Refill Flexeril.   --- May repeat Lumbar RFA every 6 months PRN  --- Follow-up in approximately 3 months.      Dictated utilizing Dragon dictation.

## 2023-07-26 ENCOUNTER — PREP FOR SURGERY (OUTPATIENT)
Dept: SURGERY | Facility: SURGERY CENTER | Age: 53
End: 2023-07-26
Payer: COMMERCIAL

## 2023-07-26 ENCOUNTER — OFFICE VISIT (OUTPATIENT)
Dept: PAIN MEDICINE | Facility: CLINIC | Age: 53
End: 2023-07-26
Payer: COMMERCIAL

## 2023-07-26 VITALS
BODY MASS INDEX: 33.04 KG/M2 | DIASTOLIC BLOOD PRESSURE: 70 MMHG | HEART RATE: 77 BPM | SYSTOLIC BLOOD PRESSURE: 110 MMHG | OXYGEN SATURATION: 97 % | WEIGHT: 230.8 LBS | TEMPERATURE: 98 F | RESPIRATION RATE: 20 BRPM | HEIGHT: 70 IN

## 2023-07-26 DIAGNOSIS — M62.838 MUSCLE SPASM: ICD-10-CM

## 2023-07-26 DIAGNOSIS — G89.29 CHRONIC BILATERAL LOW BACK PAIN WITHOUT SCIATICA: ICD-10-CM

## 2023-07-26 DIAGNOSIS — M54.50 CHRONIC BILATERAL LOW BACK PAIN WITHOUT SCIATICA: ICD-10-CM

## 2023-07-26 DIAGNOSIS — M47.816 LUMBAR FACET ARTHROPATHY: Primary | ICD-10-CM

## 2023-07-26 PROCEDURE — 99214 OFFICE O/P EST MOD 30 MIN: CPT | Performed by: NURSE PRACTITIONER

## 2023-07-26 RX ORDER — SODIUM CHLORIDE 0.9 % (FLUSH) 0.9 %
10 SYRINGE (ML) INJECTION AS NEEDED
OUTPATIENT
Start: 2023-07-26

## 2023-07-26 RX ORDER — SODIUM CHLORIDE 0.9 % (FLUSH) 0.9 %
10 SYRINGE (ML) INJECTION EVERY 12 HOURS SCHEDULED
OUTPATIENT
Start: 2023-07-26

## 2023-07-26 RX ORDER — CYCLOBENZAPRINE HCL 5 MG
5 TABLET ORAL 2 TIMES DAILY PRN
Qty: 60 TABLET | Refills: 1 | Status: SHIPPED | OUTPATIENT
Start: 2023-07-26

## 2023-07-26 NOTE — PROGRESS NOTES
CHIEF COMPLAINT  Back pain  Patient here to discuss RFA.    Subjective   Aldair Martinez is a 52 y.o. male  who presents for follow-up.  He has a history of back pain.  Today his pain is 6/10VAS in severity. He states that the relief from his RFA is slowly waning. He does state that he continues to have at least 50% relief at this time. He would like to go ahead and schedule his repeat RFA as his last several RFAs have consistently worn off at the 6 month osito.     He does continue to utilize flexeril 5 mg PRN. He states that he will typically take this in the evening, but only utilizes it if his pain is significant. He does not use this daily.  He denies any side effects.     Procedure list:  3/30/2023-bilateral L3-L5 RFA-95% relief, ONGOING 50% relief at this time.   9/29/2022-bilateral L3-L5 RFA-80% relief with 50% relief lasting 6 months. His pain is now returning.   3/29/2022-bilateral L3-L5 RFA-80% relief  8/12/2021-bilateral L3-L5 RFA-65 to 75% relief  12/31/2020-bilateral L3-L5 RFA-60 to 70% relief  11/3/2020-bilateral L3-L5 MBB-80 to 85% diagnostic relief  6/1/2020-bilateral L3-L5 RFA-75 to 80% relief  1/15/2020-bilateral L3-L5 MBB-80% diagnostic relief  12/12/2019-bilateral L3-L5 MBB-80% diagnostic relief    Back Pain  This is a chronic problem. The current episode started more than 1 year ago. The problem occurs daily. The problem has been gradually worsening since onset. The pain is present in the lumbar spine. The quality of the pain is described as aching and burning. The pain does not radiate. The pain is at a severity of 6/10. The pain is moderate. The symptoms are aggravated by position, sitting and lying down. Pertinent negatives include no abdominal pain, chest pain, dysuria, fever, headaches, numbness or weakness. He has tried home exercises (PT, RFL) for the symptoms. The treatment provided significant (RFL) relief.      PEG Assessment   What number best describes your pain on average in the  "past week?5  What number best describes how, during the past week, pain has interfered with your enjoyment of life?0  What number best describes how, during the past week, pain has interfered with your general activity?  0    The following portions of the patient's history were reviewed and updated as appropriate: allergies, current medications, past family history, past medical history, past social history, past surgical history, and problem list.    Review of Systems   Constitutional:  Negative for activity change, fatigue and fever.   Respiratory:  Negative for cough and chest tightness.    Cardiovascular:  Negative for chest pain.   Gastrointestinal:  Negative for abdominal pain, constipation and diarrhea.   Genitourinary:  Negative for difficulty urinating and dysuria.   Musculoskeletal:  Positive for back pain.   Neurological:  Negative for dizziness, weakness, light-headedness, numbness and headaches.   Psychiatric/Behavioral:  Negative for agitation, sleep disturbance and suicidal ideas. The patient is not nervous/anxious.      --  The aforementioned information the Chief Complaint section and above subjective data including any HPI data, and also the Review of Systems data, has been personally reviewed and affirmed.  --    Vitals:    07/26/23 0746   BP: 110/70   BP Location: Right arm   Patient Position: Sitting   Cuff Size: Large Adult   Pulse: 77   Resp: 20   Temp: 98 °F (36.7 °C)   TempSrc: Temporal   SpO2: 97%   Weight: 105 kg (230 lb 12.8 oz)   Height: 177.8 cm (70\")   PainSc:   6     Objective   Physical Exam  Vitals and nursing note reviewed.   Constitutional:       Appearance: Normal appearance. He is well-developed.   Eyes:      General: Lids are normal.   Cardiovascular:      Rate and Rhythm: Normal rate.   Pulmonary:      Effort: Pulmonary effort is normal.   Musculoskeletal:      Lumbar back: Tenderness and bony tenderness present. Decreased range of motion.      Comments:   +Lumbar facet " loading   Neurological:      Mental Status: He is alert and oriented to person, place, and time.   Psychiatric:         Attention and Perception: Attention normal.         Mood and Affect: Mood normal.         Speech: Speech normal.         Behavior: Behavior normal.         Judgment: Judgment normal.       Assessment & Plan   Diagnoses and all orders for this visit:    1. Lumbar facet arthropathy (Primary)    2. Chronic bilateral low back pain without sciatica    3. Muscle spasm      Aldair Martinez reports a pain score of 6.  Given his pain assessment as noted, treatment options were discussed and the following options were decided upon as a follow-up plan to address the patient's pain:  injections .    Thermal Radiofrequency Destruction    This repeat thermal radiofrequency destruction (RFA) of cervical, thoracic, or lumbar paravertebral facet joint (medial branch) nerves at the same anatomical site is considered medically reasonable and necessary as this patient has met the criteria of having a minimum of consistent 50% improvement in pain for at least six (6) months or at least 50% consistent improvement in the ability to perform previously painful movements and ADLs as compared to baseline measurement using the same scale.    --- Bilateral L3-L5 RFA  Reviewed the procedure at length with the patient.  Included in the review was expectations, complications, risk and benefits.The procedure was described in detail and the risks, benefits and alternatives were discussed with the patient (including but not limited to: bleeding, infection, nerve damage, worsening of pain, inability to perform injection, paralysis, seizures, coma, no pain relief and death) who agreed to proceed.  Discussed the potential for sedation if warranted/wanted.  The procedure will plan to be performed at [facility] with fluoroscopic guidance(unless ultrasound is indicated) and could potentially have steroids and  contrast dye used. Questions were answered and in a way the patient could understand.  Patient verbalized understanding and wishes to proceed.  This intervention will be ordered.  Discussed with patient that all procedures are part of a multimodal plan of care and include either formal PT or a home exercise program.  Patient has no evidence of coagulopathy or current infection.    --- Follow-up when pain returns/worsens.      Dictated utilizing Dragon dictation.

## 2023-09-11 ENCOUNTER — TRANSCRIBE ORDERS (OUTPATIENT)
Dept: SURGERY | Facility: SURGERY CENTER | Age: 53
End: 2023-09-11
Payer: COMMERCIAL

## 2023-09-11 DIAGNOSIS — Z41.9 SURGERY, ELECTIVE: Primary | ICD-10-CM

## 2023-10-04 RX ORDER — MIDAZOLAM HYDROCHLORIDE 2 MG/2ML
6 INJECTION, SOLUTION INTRAMUSCULAR; INTRAVENOUS ONCE
Status: COMPLETED | OUTPATIENT
Start: 2023-10-05 | End: 2023-10-05

## 2023-10-04 RX ORDER — FENTANYL CITRATE 50 UG/ML
100 INJECTION, SOLUTION INTRAMUSCULAR; INTRAVENOUS ONCE
Status: COMPLETED | OUTPATIENT
Start: 2023-10-05 | End: 2023-10-05

## 2023-10-05 ENCOUNTER — HOSPITAL ENCOUNTER (OUTPATIENT)
Dept: GENERAL RADIOLOGY | Facility: SURGERY CENTER | Age: 53
Setting detail: HOSPITAL OUTPATIENT SURGERY
End: 2023-10-05
Payer: COMMERCIAL

## 2023-10-05 ENCOUNTER — HOSPITAL ENCOUNTER (OUTPATIENT)
Facility: SURGERY CENTER | Age: 53
Setting detail: HOSPITAL OUTPATIENT SURGERY
Discharge: HOME | End: 2023-10-05
Attending: ANESTHESIOLOGY | Admitting: ANESTHESIOLOGY
Payer: COMMERCIAL

## 2023-10-05 VITALS
HEIGHT: 70 IN | HEART RATE: 69 BPM | RESPIRATION RATE: 16 BRPM | WEIGHT: 230 LBS | DIASTOLIC BLOOD PRESSURE: 75 MMHG | BODY MASS INDEX: 32.93 KG/M2 | OXYGEN SATURATION: 96 % | TEMPERATURE: 98.2 F | SYSTOLIC BLOOD PRESSURE: 126 MMHG

## 2023-10-05 DIAGNOSIS — G89.29 CHRONIC BILATERAL LOW BACK PAIN WITHOUT SCIATICA: ICD-10-CM

## 2023-10-05 DIAGNOSIS — M54.50 CHRONIC BILATERAL LOW BACK PAIN WITHOUT SCIATICA: ICD-10-CM

## 2023-10-05 DIAGNOSIS — Z41.9 SURGERY, ELECTIVE: ICD-10-CM

## 2023-10-05 DIAGNOSIS — M47.816 LUMBAR FACET ARTHROPATHY: ICD-10-CM

## 2023-10-05 PROCEDURE — 25010000002 FENTANYL CITRATE (PF) 50 MCG/ML SOLUTION: Performed by: ANESTHESIOLOGY

## 2023-10-05 PROCEDURE — 76000 FLUOROSCOPY <1 HR PHYS/QHP: CPT

## 2023-10-05 PROCEDURE — 25010000002 BUPIVACAINE (PF) 0.5 % SOLUTION 10 ML VIAL: Performed by: ANESTHESIOLOGY

## 2023-10-05 PROCEDURE — 64635 DESTROY LUMB/SAC FACET JNT: CPT | Performed by: ANESTHESIOLOGY

## 2023-10-05 PROCEDURE — 25010000002 MIDAZOLAM PER 1MG: Performed by: ANESTHESIOLOGY

## 2023-10-05 PROCEDURE — 64636 DESTROY L/S FACET JNT ADDL: CPT | Performed by: ANESTHESIOLOGY

## 2023-10-05 RX ORDER — SODIUM CHLORIDE 0.9 % (FLUSH) 0.9 %
10 SYRINGE (ML) INJECTION EVERY 12 HOURS SCHEDULED
Status: DISCONTINUED | OUTPATIENT
Start: 2023-10-05 | End: 2023-10-05 | Stop reason: HOSPADM

## 2023-10-05 RX ORDER — SODIUM CHLORIDE 0.9 % (FLUSH) 0.9 %
10 SYRINGE (ML) INJECTION AS NEEDED
Status: DISCONTINUED | OUTPATIENT
Start: 2023-10-05 | End: 2023-10-05 | Stop reason: HOSPADM

## 2023-10-05 RX ADMIN — MIDAZOLAM HYDROCHLORIDE 6 MG: 2 INJECTION, SOLUTION INTRAMUSCULAR; INTRAVENOUS at 08:14

## 2023-10-05 RX ADMIN — FENTANYL CITRATE 100 MCG: 0.05 INJECTION, SOLUTION INTRAMUSCULAR; INTRAVENOUS at 08:18

## 2023-10-05 NOTE — DISCHARGE INSTRUCTIONS
[facility] [place] [facility] - Post-procedure Instructions          --  While there are no absolute restrictions, it is recommended that you do not perform strenuous activity today. In the morning, you may resume your level of activity as before your block.    --  If you have a band-aid at your injection site, please remove it later today. Observe the area for any redness, swelling, pus-like drainage, or a temperature over 101°. If any of these symptoms occur, please call your doctor at [phone]. If after office hours, leave a message and the on-call provider will return your call.    --  Ice may be applied to your injection site. It is recommended you avoid direct heat (heating pad; hot tub) for 1-2 days.    --  Call [facility] [place] at [phone] if you experience persistent headache, persistent bleeding from the injection site, or severe pain not relieved by heat or oral medication.    --  Do not make important decisions today.    --  Due to the effects of the block and/or the I.V. Sedation, DO NOT drive or operate hazardous machinery for 12 hours.  Local anesthetics may cause numbness after procedure and precautions must be taken with regards to operating equipment as well as with walking, even if ambulating with assistance of another person or with an assistive device.    --  Do not drink alcohol for 12 hours.    -- You may return to work tomorrow, or as directed by your referring doctor.    --  Occasionally you may notice a slight increase in your pain after the procedure. This should start to improve within the next 24-48 hours. Radiofrequency ablation procedure pain may last 3-4 weeks.    --  It may take as long as 3-4 days before you notice a gradual improvement in your pain and/or other symptoms.    -- You may continue to take your prescribed pain medication as needed.    --  Some normal possible side effects of steroid use could include fluid retention, increased blood sugar, dull headache,  increased sweating, increased appetite, mood swings and flushing.    --  Diabetics are recommended to watch their blood glucose level closely for 24-48 hours after the injection.    --  Must stay in PACU for 20 min upon arrival and prove no leg weakness before being discharged.    --  IN THE EVENT OF A LIFE THREATENING EMERGENCY, (CHEST PAIN, BREATHING DIFFICULTIES, PARALYSIS…) YOU SHOULD GO TO YOUR NEAREST EMERGENCY ROOM.    --  You should be contacted by our office within 2-3 days to schedule follow up or next appointment date.  If not contacted within 7 days, please call the office at [phone]

## 2023-10-05 NOTE — OP NOTE
Bilateral L3-5 Lumbar Medial Branch RADIOFREQUENCY  [facility]      PREOPERATIVE DIAGNOSIS:  Lumbar spondylosis without myelopathy    POSTOPERATIVE DIAGNOSIS:  Lumbar spondylosis without myelopathy    PROCEDURE:   Diagnostic Bilateral Lumbar Medial Branch Nerve thermal radiofrequency lesioning, with fluoroscopy:  L3, L4, and L5 nerves (at the L4 and L5 transverse processes and the sacral alar groove) to thermally treat the innervation to facet joints L4-5 and L5-S1  [id number] -- Bilateral L/S facet neuro destr., 1st Level  25186-07 -- Bilateral L/S facet neuro destr., 2nd  Level    PRE-PROCEDURE DISCUSSION WITH PATIENT:    Risks and complications were discussed with the patient prior to starting the procedure and informed consent was obtained.      SURGEON:  Onesimo Patino MD    REASON FOR PROCEDURE:    Previous clinically significant therapeutic effect after prior Radiofrequency procedure    SEDATION:  Versed 6mg & Fentanyl 100 mcg IV  TIME OF PROCEDURE:   The intraoperative procedure time after administration of the sedative was (8:16-8:37) 21 minutes.     ANESTHETIC:  Lidocaine 2%  STEROID:  NONE      DESCRIPTON OF PROCEDURE:  After obtaining informed consent, IV access  was obtained in the preoperative area.   The patient was taken to the operating room.  The patient was placed in the prone position with a pillow under the abdomen. All pressure points were well padded.  EKG, blood pressure, and pulse oximeter were monitored.  The patient was monitored and sedated by the RN under my direction. The lumbosacral area was prepped with Chloraprep and draped in a sterile fashion.     Under fluoroscopic guidance the transverse processes of the L4 and L5 vertebrae at the junctions of the superior articular processes were identified on the right.  Also identified was the groove between the ala and the superior articular process of the sacrum on the ipsilateral side.  Skin and subcutaneous tissue were  anesthetized with 1ml of 1% lidocaine above each of these points. Then, radiofrequency probe needles were advanced in this fluoro view to the above junctions.  Aspiration was negative for blood and CSF.  After confirming the position of the needle with fluoroscope in all views, testing was initiated.  First, sensory testing was started on each needle a 1V and 50Hz and slowly decreased until painful pressure stimulation diminished at 0.5V.  Next, motor testing was confirmed to be negative at 3V and 2Hz for any radicular stimulation.  Then 1mL of the local anesthetic was instilled in each needle.  Two minutes elapsed, and during this time a lateral fluoroscopic view was confirmed again to ensure the needles had not advanced nor retracted.  Then, Radiofrequency Lesioning was initiated for 3 minutes at 82 degrees Celsius.  Needles were removed intact from each of the areas.     A similar procedure was repeated to address the L3, L4, and L5 nerves on the contralateral side.   Onset of analgesia was noted.  Vital signs remained stable throughout.      ESTIMATED BLOOD LOSS:  <5 mL  SPECIMENS:  none    COMPLICATIONS:   No complications were noted.    TOLERANCE & DISCHARGE CONDITION:    The patient tolerated the procedure well.  The patient was transported to the recovery area without difficulties.  The patient was discharged to home under the care of family in stable and satisfactory condition.    PLAN OF CARE:  The patient was given our standard instruction sheet.  The patient will  Return to clinic 6 wks.  The patient will resume all medications as per the medication reconciliation sheet.

## 2024-03-08 ENCOUNTER — PREP FOR SURGERY (OUTPATIENT)
Dept: SURGERY | Facility: SURGERY CENTER | Age: 54
End: 2024-03-08
Payer: COMMERCIAL

## 2024-03-08 ENCOUNTER — OFFICE VISIT (OUTPATIENT)
Dept: PAIN MEDICINE | Facility: CLINIC | Age: 54
End: 2024-03-08
Payer: COMMERCIAL

## 2024-03-08 VITALS
TEMPERATURE: 97 F | WEIGHT: 210.4 LBS | OXYGEN SATURATION: 97 % | HEIGHT: 70 IN | HEART RATE: 76 BPM | SYSTOLIC BLOOD PRESSURE: 115 MMHG | BODY MASS INDEX: 30.12 KG/M2 | DIASTOLIC BLOOD PRESSURE: 76 MMHG

## 2024-03-08 DIAGNOSIS — M47.816 LUMBAR FACET ARTHROPATHY: Primary | ICD-10-CM

## 2024-03-08 DIAGNOSIS — M54.50 CHRONIC BILATERAL LOW BACK PAIN WITHOUT SCIATICA: ICD-10-CM

## 2024-03-08 DIAGNOSIS — G89.29 OTHER CHRONIC PAIN: ICD-10-CM

## 2024-03-08 DIAGNOSIS — G89.29 CHRONIC BILATERAL LOW BACK PAIN WITHOUT SCIATICA: ICD-10-CM

## 2024-03-08 PROCEDURE — 99214 OFFICE O/P EST MOD 30 MIN: CPT | Performed by: NURSE PRACTITIONER

## 2024-03-08 RX ORDER — SODIUM CHLORIDE 0.9 % (FLUSH) 0.9 %
10 SYRINGE (ML) INJECTION EVERY 12 HOURS SCHEDULED
OUTPATIENT
Start: 2024-03-08

## 2024-03-08 RX ORDER — SEMAGLUTIDE 1.34 MG/ML
1 INJECTION, SOLUTION SUBCUTANEOUS WEEKLY
COMMUNITY

## 2024-03-08 RX ORDER — FLUTICASONE PROPIONATE 50 MCG
1 SPRAY, SUSPENSION (ML) NASAL DAILY
COMMUNITY

## 2024-03-08 RX ORDER — SODIUM CHLORIDE 0.9 % (FLUSH) 0.9 %
10 SYRINGE (ML) INJECTION AS NEEDED
OUTPATIENT
Start: 2024-03-08

## 2024-03-08 NOTE — PROGRESS NOTES
"CHIEF COMPLAINT  F/U back pain- Bilateral L3-L5 RADIOFREQUENCY ABLATION LUMBAR- patient states that he has had 80% improvement since the procedure.        Subjective   Aldair Martinez is a 53 y.o. male  who presents to the office for follow-up of procedure.  He completed a a Bilateral L3-L5 RFA   on  10/3/2023 performed by Dr. Patino for management of back pain. Patient reports 80% relief from the procedure with ONGOING 50% relief     Today his pain is 6/10VAS in severity. He states that his relief from his lumbar RFA is beginning to wane (he does note continued 50% relief at this time). He decreased his back pain as a \"stretching\" and \"stabbing\" pain that is worsened by bending and twisting. He denies any lower extremity pain or weakness. He states with his relief from the procedure waning he has been needing to use his flexeril more often.     Previously completed PT for his low back pain which caused worsening pain.     Procedure list:  3/30/2023-bilateral L3-L5 RFA-95% relief, ONGOING 50% relief at this time.   9/29/2022-bilateral L3-L5 RFA-80% relief with 50% relief lasting 6 months. His pain is now returning.   3/29/2022-bilateral L3-L5 RFA-80% relief  8/12/2021-bilateral L3-L5 RFA-65 to 75% relief  12/31/2020-bilateral L3-L5 RFA-60 to 70% relief  11/3/2020-bilateral L3-L5 MBB-80 to 85% diagnostic relief  6/1/2020-bilateral L3-L5 RFA-75 to 80% relief  1/15/2020-bilateral L3-L5 MBB-80% diagnostic relief  12/12/2019-bilateral L3-L5 MBB-80% diagnostic relief    Back Pain  This is a chronic problem. The current episode started more than 1 year ago. The problem occurs daily. The problem has been gradually worsening since onset. The pain is present in the lumbar spine. The quality of the pain is described as aching and burning. The pain does not radiate. The pain is at a severity of 6/10. The pain is moderate. The symptoms are aggravated by position, sitting and lying down. Pertinent negatives include no abdominal " "pain, chest pain, dysuria, fever, headaches, numbness or weakness. He has tried home exercises and muscle relaxant (PT, RFL) for the symptoms. The treatment provided significant (RFL) relief.      PEG Assessment   What number best describes your pain on average in the past week?6  What number best describes how, during the past week, pain has interfered with your enjoyment of life?0  What number best describes how, during the past week, pain has interfered with your general activity?  0    The following portions of the patient's history were reviewed and updated as appropriate: allergies, current medications, past family history, past medical history, past social history, past surgical history, and problem list.    Review of Systems   Constitutional:  Negative for activity change, chills, fatigue and fever.   HENT:  Negative for congestion.    Respiratory:  Negative for chest tightness and shortness of breath.    Cardiovascular:  Negative for chest pain.   Gastrointestinal:  Negative for abdominal pain, constipation and diarrhea.   Genitourinary:  Negative for difficulty urinating and dysuria.   Musculoskeletal:  Positive for back pain.   Neurological:  Negative for dizziness, weakness, light-headedness, numbness and headaches.   Psychiatric/Behavioral:  Positive for sleep disturbance. Negative for agitation. The patient is not nervous/anxious.        --  The aforementioned information the Chief Complaint section and above subjective data including any HPI data, and also the Review of Systems data, has been personally reviewed and affirmed.  --     Vitals:    03/08/24 0742   BP: 115/76   Pulse: 76   Temp: 97 °F (36.1 °C)   SpO2: 97%   Weight: 95.4 kg (210 lb 6.4 oz)   Height: 177.8 cm (70\")   PainSc:   6   PainLoc: Back     Objective   Physical Exam  Vitals and nursing note reviewed.   Constitutional:       Appearance: Normal appearance. He is well-developed.   Eyes:      General: Lids are normal.   Cardiovascular: "      Rate and Rhythm: Normal rate.   Pulmonary:      Effort: Pulmonary effort is normal.   Musculoskeletal:      Lumbar back: Tenderness and bony tenderness present. Decreased range of motion. Negative right straight leg raise test and negative left straight leg raise test.      Comments:   +Lumbar facet loading  -Olvin SI compression  -Olvin July  -Olvin Thigh Thrust  -Olvin Gaenslen's   Neurological:      Mental Status: He is alert and oriented to person, place, and time.      Motor: No weakness.   Psychiatric:         Attention and Perception: Attention normal.         Mood and Affect: Mood normal.         Speech: Speech normal.         Behavior: Behavior normal.         Judgment: Judgment normal.       Assessment & Plan   Diagnoses and all orders for this visit:    1. Lumbar facet arthropathy (Primary)    2. Chronic bilateral low back pain without sciatica    3. Other chronic pain      Aldair Martinez reports a pain score of 6.  Given his pain assessment as noted, treatment options were discussed and the following options were decided upon as a follow-up plan to address the patient's pain: Injections    Thermal Radiofrequency Destruction    This repeat thermal radiofrequency destruction (RFA) of cervical, thoracic, or lumbar paravertebral facet joint (medial branch) nerves at the same anatomical site is considered medically reasonable and necessary as this patient has met the criteria of having a minimum of consistent 50% improvement in pain for at least six (6) months or at least 50% consistent improvement in the ability to perform previously painful movements and ADLs as compared to baseline measurement using the same scale.    --- Repeat Bilateral L3-L5 RFA  Reviewed the procedure at length with the patient.  Included in the review was expectations, complications, risk and benefits.The procedure was described in detail and the risks, benefits and alternatives were discussed with the patient (including but not  limited to: bleeding, infection, nerve damage, worsening of pain, inability to perform injection, paralysis, seizures, coma, no pain relief and death) who agreed to proceed.  Discussed the potential for sedation if warranted/wanted.  The procedure will plan to be performed at [facility] with fluoroscopic guidance(unless ultrasound is indicated) and could potentially have steroids and contrast dye used. Questions were answered and in a way the patient could understand.  Patient verbalized understanding and wishes to proceed.  This intervention will be ordered.  Discussed with patient that all procedures are part of a multimodal plan of care and include either formal PT or a home exercise program.  Patient has no evidence of coagulopathy or current infection.  --- Continue Flexeril PRN  --- Follow-up after procedure     Dictated utilizing Dragon dictation.

## 2024-03-11 ENCOUNTER — TRANSCRIBE ORDERS (OUTPATIENT)
Dept: SURGERY | Facility: SURGERY CENTER | Age: 54
End: 2024-03-11
Payer: COMMERCIAL

## 2024-03-11 DIAGNOSIS — Z41.9 SURGERY, ELECTIVE: Primary | ICD-10-CM

## 2024-04-24 RX ORDER — MIDAZOLAM HYDROCHLORIDE 2 MG/2ML
6 INJECTION, SOLUTION INTRAMUSCULAR; INTRAVENOUS ONCE
Status: COMPLETED | OUTPATIENT
Start: 2024-04-25 | End: 2024-04-25

## 2024-04-24 RX ORDER — FENTANYL CITRATE 50 UG/ML
100 INJECTION, SOLUTION INTRAMUSCULAR; INTRAVENOUS ONCE
Status: COMPLETED | OUTPATIENT
Start: 2024-04-25 | End: 2024-04-25

## 2024-04-25 ENCOUNTER — HOSPITAL ENCOUNTER (OUTPATIENT)
Facility: SURGERY CENTER | Age: 54
Setting detail: HOSPITAL OUTPATIENT SURGERY
Discharge: HOME | End: 2024-04-25
Attending: ANESTHESIOLOGY | Admitting: ANESTHESIOLOGY
Payer: COMMERCIAL

## 2024-04-25 ENCOUNTER — HOSPITAL ENCOUNTER (OUTPATIENT)
Dept: GENERAL RADIOLOGY | Facility: SURGERY CENTER | Age: 54
Setting detail: HOSPITAL OUTPATIENT SURGERY
End: 2024-04-25
Payer: COMMERCIAL

## 2024-04-25 VITALS
SYSTOLIC BLOOD PRESSURE: 117 MMHG | OXYGEN SATURATION: 98 % | HEART RATE: 70 BPM | HEIGHT: 70 IN | DIASTOLIC BLOOD PRESSURE: 81 MMHG | TEMPERATURE: 98 F | BODY MASS INDEX: 29.06 KG/M2 | RESPIRATION RATE: 16 BRPM | WEIGHT: 203 LBS

## 2024-04-25 DIAGNOSIS — M47.816 LUMBAR FACET ARTHROPATHY: ICD-10-CM

## 2024-04-25 DIAGNOSIS — Z41.9 SURGERY, ELECTIVE: ICD-10-CM

## 2024-04-25 PROCEDURE — 64636 DESTROY L/S FACET JNT ADDL: CPT | Performed by: ANESTHESIOLOGY

## 2024-04-25 PROCEDURE — 76000 FLUOROSCOPY <1 HR PHYS/QHP: CPT

## 2024-04-25 PROCEDURE — 99152 MOD SED SAME PHYS/QHP 5/>YRS: CPT | Performed by: ANESTHESIOLOGY

## 2024-04-25 PROCEDURE — 25010000002 MIDAZOLAM PER 1MG: Performed by: ANESTHESIOLOGY

## 2024-04-25 PROCEDURE — 25010000002 BUPIVACAINE (PF) 0.5 % SOLUTION 10 ML VIAL: Performed by: ANESTHESIOLOGY

## 2024-04-25 PROCEDURE — 64635 DESTROY LUMB/SAC FACET JNT: CPT | Performed by: ANESTHESIOLOGY

## 2024-04-25 PROCEDURE — 25010000002 FENTANYL CITRATE (PF) 50 MCG/ML SOLUTION: Performed by: ANESTHESIOLOGY

## 2024-04-25 PROCEDURE — S0260 H&P FOR SURGERY: HCPCS | Performed by: ANESTHESIOLOGY

## 2024-04-25 RX ORDER — SODIUM CHLORIDE 0.9 % (FLUSH) 0.9 %
10 SYRINGE (ML) INJECTION EVERY 12 HOURS SCHEDULED
Status: DISCONTINUED | OUTPATIENT
Start: 2024-04-25 | End: 2024-04-25 | Stop reason: HOSPADM

## 2024-04-25 RX ORDER — SODIUM CHLORIDE 0.9 % (FLUSH) 0.9 %
10 SYRINGE (ML) INJECTION AS NEEDED
Status: DISCONTINUED | OUTPATIENT
Start: 2024-04-25 | End: 2024-04-25 | Stop reason: HOSPADM

## 2024-04-25 RX ADMIN — MIDAZOLAM HYDROCHLORIDE 6 MG: 2 INJECTION, SOLUTION INTRAMUSCULAR; INTRAVENOUS at 08:10

## 2024-04-25 RX ADMIN — FENTANYL CITRATE 100 MCG: 50 INJECTION INTRAMUSCULAR; INTRAVENOUS at 08:11

## 2024-04-25 NOTE — H&P
Brief Pre-procedural / Pre-operative H&P        -----    Pertinent Diagnosis:   Lumbar facet arthropathy.  Lumbar spondylosis    Proposed Procedure: Lumbar medial branch radiofrequency ablation      Subjective   Aldair Martinez is a 53 y.o. male  who presents for intervention.  He has a history of back pain.      History of Present Illness     Significant therapeutic relief from radiofrequency ablations on multiple occasions.  Recurrent pain.  With significant therapeutic relief on 7 occasions repeat RF ablation is indicated.  Mainly axial low back pain bilaterally bandlike distribution across the low back area.    -------    The following portions of the patient's history were reviewed and updated as appropriate: allergies, current medications, past family history, past medical history, past social history, past surgical history and problem list.    Allergies   Allergen Reactions    Penicillins Hives and Swelling     Throat swelling         Current Facility-Administered Medications:     fentaNYL citrate (PF) (SUBLIMAZE) injection 100 mcg, 100 mcg, Intravenous, Once, Onesimo Patino MD    Midazolam HCl (PF) (VERSED) injection 6 mg, 6 mg, Intravenous, Once, Onesiom Patino MD    sodium chloride 0.9 % flush 10 mL, 10 mL, Intravenous, Q12H, Onesimo Patino MD    sodium chloride 0.9 % flush 10 mL, 10 mL, Intravenous, PRN, Onesimo Patino MD    No current facility-administered medications on file prior to encounter.     Current Outpatient Medications on File Prior to Encounter   Medication Sig Dispense Refill    cyclobenzaprine (FLEXERIL) 5 MG tablet Take 1 tablet by mouth 2 (Two) Times a Day As Needed for Muscle Spasms. 60 tablet 1    hydrOXYzine pamoate (VISTARIL) 25 MG capsule Take 1 capsule by mouth Every Night.      lovastatin (MEVACOR) 40 MG tablet Take 1 tablet by mouth.      Ozempic, 1 MG/DOSE, 4 MG/3ML solution pen-injector Inject 1 mg under the skin into the appropriate area as directed 1 (One) Time  Per Week.      fluticasone (FLONASE) 50 MCG/ACT nasal spray 1 spray by Each Nare route Daily.      mometasone (ELOCON) 0.1 % cream       [DISCONTINUED] budesonide-formoterol (SYMBICORT) 80-4.5 MCG/ACT inhaler  (Patient not taking: Reported on 3/8/2024)      [DISCONTINUED] cetirizine (zyrTEC) 10 MG tablet Take 1 tablet by mouth Daily. (Patient not taking: Reported on 3/8/2024)         Patient Active Problem List   Diagnosis    Other chronic pain    Lumbar facet arthropathy    Chronic bilateral low back pain without sciatica       Past Medical History:   Diagnosis Date    Carpal tunnel syndrome     Environmental allergies     Hyperlipidemia     Umbilical hernia        Past Surgical History:   Procedure Laterality Date    EYE SURGERY Bilateral     lasix    HAND SURGERY Right     HAND SURGERY Left     RADIOFREQUENCY ABLATION Bilateral 08/12/2021    Procedure: Bilatearl L3-L5 RADIOFREQUENCY ABLATION LUMBAR;  Surgeon: Onesimo Patino MD;  Location: [facility];  Service: Pain Management;  Laterality: Bilateral;    RADIOFREQUENCY ABLATION Bilateral 03/29/2022    Procedure: Bilateral L3-L5 RADIOFREQUENCY ABLATION LUMBAR;  Surgeon: Onesimo Patino MD;  Location: [facility];  Service: Pain Management;  Laterality: Bilateral;    RADIOFREQUENCY ABLATION Bilateral 09/29/2022    Procedure: Bilateral L3-L5 RADIOFREQUENCY ABLATION LUMBAR;  Surgeon: Onesimo Patino MD;  Location: [facility];  Service: Pain Management;  Laterality: Bilateral;    RADIOFREQUENCY ABLATION Bilateral 03/30/2023    Procedure: Bilateral L3-L5 RADIOFREQUENCY ABLATION LUMBAR;  Surgeon: Onesimo Patino MD;  Location: [facility];  Service: Pain Management;  Laterality: Bilateral;    RADIOFREQUENCY ABLATION Bilateral 10/5/2023    Procedure: Bilateral L3-L5 RADIOFREQUENCY ABLATION LUMBAR 87348; [id number];  Surgeon: Onesimo Patino MD;  Location: [facility];  Service: Pain Management;  Laterality: Bilateral;    UMBILICAL HERNIA REPAIR    "      Family History   Problem Relation Age of Onset    No Known Problems Mother     No Known Problems Father        Social History     Socioeconomic History    Marital status:    Tobacco Use    Smoking status: Never    Smokeless tobacco: Never   Vaping Use    Vaping status: Never Used   Substance and Sexual Activity    Alcohol use: No    Drug use: No    Sexual activity: Defer       -------       Review of Systems  No Fever, No Chills, No ear pain, No sinus pressure or drainage, No eye pain or drainage, No cough, No SOB, No chest tightness nor chest pain, no palpitations.          Vitals:    04/24/24 1042 04/25/24 0726   BP:  116/86   BP Location:  Left arm   Patient Position:  Lying   Pulse:  70   Resp:  17   Temp:  97 °F (36.1 °C)   TempSrc:  Temporal   SpO2:  97%   Weight: 92.1 kg (203 lb) 92.1 kg (203 lb)   Height: 177.8 cm (70\") 177.8 cm (70\")         Objective   Physical Exam  VSS, NNR, NCAT, NMNA, NRD, AAOx3.      -------    Assessment & Plan:  - as noted above, the stated intervention is indicated  - Follow-up plan will be noted in the operative report        Ov 6 wks      EMR Dragon/Transcription disclaimer:   Typed items in this encounter note may have been created by electronic transcription/translation software which converts spoken language to printed text. The electronic translation of spoken language may permit erroneous, or at times, nonsensical words or phrases to be inadvertently transcribed; Although I have reviewed the note for such errors, some may still exist.      "

## 2024-04-25 NOTE — DISCHARGE INSTRUCTIONS
[facility] [place] [facility] - Post-procedure Instructions          --  While there are no absolute restrictions, it is recommended that you do not perform strenuous activity today. In the morning, you may resume your level of activity as before your block.    --  If you have a band-aid at your injection site, please remove it later today. Observe the area for any redness, swelling, pus-like drainage, or a temperature over 101°. If any of these symptoms occur, please call your doctor at [phone]. If after office hours, leave a message and the on-call provider will return your call.    --  Ice may be applied to your injection site. It is recommended you avoid direct heat (heating pad; hot tub) for 1-2 days.    --  Call [facility] [place] at [phone] if you experience persistent headache, persistent bleeding from the injection site, or severe pain not relieved by heat or oral medication.    --  Do not make important decisions today.    --  Due to the effects of the block and/or the I.V. Sedation, DO NOT drive or operate hazardous machinery for 12 hours.  Local anesthetics may cause numbness after procedure and precautions must be taken with regards to operating equipment as well as with walking, even if ambulating with assistance of another person or with an assistive device.    --  Do not drink alcohol for 12 hours.    -- You may return to work tomorrow, or as directed by your referring doctor.    --  Occasionally you may notice a slight increase in your pain after the procedure. This should start to improve within the next 24-48 hours. Radiofrequency ablation procedure pain may last 3-4 weeks.    --  It may take as long as 3-4 days before you notice a gradual improvement in your pain and/or other symptoms.    -- You may continue to take your prescribed pain medication as needed.    --  Some normal possible side effects of steroid use could include fluid retention, increased blood sugar, dull headache,  increased sweating, increased appetite, mood swings and flushing.    --  Diabetics are recommended to watch their blood glucose level closely for 24-48 hours after the injection.    --  Must stay in PACU for 20 min upon arrival and prove no leg weakness before being discharged.    --  IN THE EVENT OF A LIFE THREATENING EMERGENCY, (CHEST PAIN, BREATHING DIFFICULTIES, PARALYSIS…) YOU SHOULD GO TO YOUR NEAREST EMERGENCY ROOM.    --  You should be contacted by our office within 2-3 days to schedule follow up or next appointment date.  If not contacted within 7 days, please call the office at [phone]

## 2024-04-25 NOTE — OP NOTE
Bilateral L3-5 Lumbar Medial Branch RADIOFREQUENCY  [facility]      PREOPERATIVE DIAGNOSIS:  Lumbar spondylosis without myelopathy    POSTOPERATIVE DIAGNOSIS:  Lumbar spondylosis without myelopathy    PROCEDURE:   Diagnostic Bilateral Lumbar Medial Branch Nerve thermal radiofrequency lesioning, with fluoroscopy:  L3, L4, and L5 nerves (at the L4 and L5 transverse processes and the sacral alar groove) to thermally treat the innervation to facet joints L4-5 and L5-S1  [id number] -- Bilateral L/S facet neuro destr., 1st Level  [id number] -- Bilateral L/S facet neuro destr., 2nd  Level    PRE-PROCEDURE DISCUSSION WITH PATIENT:    Risks and complications were discussed with the patient prior to starting the procedure and informed consent was obtained.      SURGEON:  Onesimo Patino MD    REASON FOR PROCEDURE:    The patient complains of pain that seems to have a significant axial component and Previous clinically significant therapeutic effect after prior Radiofrequency procedure    SEDATION:  Versed 6mg & Fentanyl 100 mcg IV and The patient had higher than average levels of procedural anxiety and the need to provide additional procedural sedation was needed to safely proceed.  TIME OF PROCEDURE:   The intraoperative procedure time after administration of the sedative was (7512-2869) minutes.     ANESTHETIC:  Lidocaine 2%  STEROID:  NONE      DESCRIPTON OF PROCEDURE:  After obtaining informed consent, IV access  was obtained in the preoperative area.   The patient was taken to the operating room.  The patient was placed in the prone position with a pillow under the abdomen. All pressure points were well padded.  EKG, blood pressure, and pulse oximeter were monitored.  The patient was monitored and sedated by the RN under my direction. The lumbosacral area was prepped with Chloraprep and draped in a sterile fashion.     Under fluoroscopic guidance the transverse processes of the L4 and L5 vertebrae at the  junctions of the superior articular processes were identified on the right.  Also identified was the groove between the ala and the superior articular process of the sacrum on the ipsilateral side.  Skin and subcutaneous tissue were anesthetized with 1ml of 1% lidocaine above each of these points. Then, radiofrequency probe needles were advanced in this fluoro view to the above junctions.  Aspiration was negative for blood and CSF.  After confirming the position of the needle with fluoroscope in all views, testing was initiated.  First, sensory testing was started on each needle a 1V and 50Hz and slowly decreased until painful pressure stimulation diminished at 0.5V.  Next, motor testing was confirmed to be negative at 3V and 2Hz for any radicular stimulation.  Then 1mL of the local anesthetic was instilled in each needle.  Two minutes elapsed, and during this time a lateral fluoroscopic view was confirmed again to ensure the needles had not advanced nor retracted.  Then, Radiofrequency Lesioning was initiated for 3 minutes at 82 degrees Celsius.  Needles were removed intact from each of the areas.     A similar procedure was repeated to address the L3, L4, and L5 nerves on the contralateral side.   Onset of analgesia was noted.  Vital signs remained stable throughout.      ESTIMATED BLOOD LOSS:  <5 mL  SPECIMENS:  none    COMPLICATIONS:   No complications were noted. and The patient did not have any signs of postprocedure numbness nor weakness.    TOLERANCE & DISCHARGE CONDITION:    The patient tolerated the procedure well.  The patient was transported to the recovery area without difficulties.  The patient was discharged to home under the care of family in stable and satisfactory condition.    PLAN OF CARE:  The patient was given our standard instruction sheet.  The patient will  Return to clinic 6 wks.  The patient will resume all medications as per the medication reconciliation sheet.

## 2024-06-06 ENCOUNTER — TELEPHONE (OUTPATIENT)
Dept: PAIN MEDICINE | Facility: CLINIC | Age: 54
End: 2024-06-06

## 2024-06-06 RX ORDER — CYCLOBENZAPRINE HCL 5 MG
5 TABLET ORAL 2 TIMES DAILY PRN
Qty: 60 TABLET | Refills: 1 | Status: CANCELLED | OUTPATIENT
Start: 2024-06-06

## 2024-06-17 RX ORDER — CYCLOBENZAPRINE HCL 5 MG
5 TABLET ORAL 2 TIMES DAILY PRN
Qty: 60 TABLET | Refills: 1 | Status: SHIPPED | OUTPATIENT
Start: 2024-06-17

## 2024-06-17 NOTE — TELEPHONE ENCOUNTER
Refill  Last OV: 3/8/24  Next OV: none scheduled    Patient requested RX on 6/6/24, however there was not a prescription sent that I can see.

## 2024-08-26 ENCOUNTER — OFFICE VISIT (OUTPATIENT)
Dept: PAIN MEDICINE | Facility: CLINIC | Age: 54
End: 2024-08-26
Payer: COMMERCIAL

## 2024-08-26 ENCOUNTER — PREP FOR SURGERY (OUTPATIENT)
Dept: SURGERY | Facility: SURGERY CENTER | Age: 54
End: 2024-08-26
Payer: COMMERCIAL

## 2024-08-26 VITALS
DIASTOLIC BLOOD PRESSURE: 42 MMHG | WEIGHT: 203.8 LBS | HEIGHT: 70 IN | RESPIRATION RATE: 18 BRPM | BODY MASS INDEX: 29.18 KG/M2 | HEART RATE: 77 BPM | TEMPERATURE: 97.3 F | SYSTOLIC BLOOD PRESSURE: 142 MMHG

## 2024-08-26 DIAGNOSIS — M47.816 LUMBAR FACET ARTHROPATHY: Primary | ICD-10-CM

## 2024-08-26 DIAGNOSIS — G89.29 CHRONIC BILATERAL LOW BACK PAIN WITHOUT SCIATICA: ICD-10-CM

## 2024-08-26 DIAGNOSIS — M62.838 MUSCLE SPASM: Primary | ICD-10-CM

## 2024-08-26 DIAGNOSIS — M47.816 LUMBAR FACET ARTHROPATHY: ICD-10-CM

## 2024-08-26 DIAGNOSIS — M54.50 CHRONIC BILATERAL LOW BACK PAIN WITHOUT SCIATICA: ICD-10-CM

## 2024-08-26 PROCEDURE — 99214 OFFICE O/P EST MOD 30 MIN: CPT | Performed by: NURSE PRACTITIONER

## 2024-08-26 RX ORDER — SODIUM CHLORIDE 0.9 % (FLUSH) 0.9 %
10 SYRINGE (ML) INJECTION AS NEEDED
OUTPATIENT
Start: 2024-08-26

## 2024-08-26 RX ORDER — CYCLOBENZAPRINE HCL 5 MG
5 TABLET ORAL 2 TIMES DAILY PRN
Qty: 180 TABLET | Refills: 1 | Status: SHIPPED | OUTPATIENT
Start: 2024-08-26

## 2024-08-26 RX ORDER — SODIUM CHLORIDE 0.9 % (FLUSH) 0.9 %
10 SYRINGE (ML) INJECTION EVERY 12 HOURS SCHEDULED
OUTPATIENT
Start: 2024-08-26

## 2024-08-26 NOTE — PROGRESS NOTES
"CHIEF COMPLAINT  Follow-up for back pain.      Subjective   Aldair Martinez is a 53 y.o. male  who presents to the office for follow-up of procedure.  He completed a Bilateral L3-5 Lumbar Medial Branch RADIOFREQUENCY   on 4/25/2024  performed by Dr. Patino for management of back pain. Patient reports 75% relief from the procedure for 3 months with ONGOING 50% relief.     Today his pain is 6/10VAS in severity. He states that this back pain is slowly starting to return.  His pain remains in his axial low back and does not radiate into his lower extremities. He describes this as \"grabbing\" pain. This is worsened with lying in a supine position, bending, twisting, and lifting.  He continues to utilize flexeril 5 mg BID PRN, he does not utilize this every day. He states that this is helpful with his muscle spasms, he denies any side effects.     Previously completed PT for his low back pain which caused worsening pain.     He continues to work full time. 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      Procedure list:  10/3/2023-bilateral L3-L5 RFA-80% initial relief with 50% relief lasting 6 months  3/30/2023-bilateral L3-L5 RFA-95% relief, ONGOING 50% relief at this time.   9/29/2022-bilateral L3-L5 RFA-80% relief with 50% relief lasting 6 months. His pain is now returning.   3/29/2022-bilateral L3-L5 RFA-80% relief  8/12/2021-bilateral L3-L5 RFA-65 to 75% relief  12/31/2020-bilateral L3-L5 RFA-60 to 70% relief  11/3/2020-bilateral L3-L5 MBB-80 to 85% diagnostic relief  6/1/2020-bilateral L3-L5 RFA-75 to 80% relief  1/15/2020-bilateral L3-L5 MBB-80% diagnostic relief  12/12/2019-bilateral L3-L5 MBB-80% diagnostic relief    Back Pain  This is a chronic problem. The current episode started more than 1 year ago. The problem occurs daily. Progression since onset: worsened since last office visit. The pain is present in the lumbar spine. The quality of the pain is described as aching and burning. The pain does not radiate. The " "pain is at a severity of 6/10. The pain is moderate. The symptoms are aggravated by position, sitting and lying down. Pertinent negatives include no abdominal pain, chest pain, dysuria, fever, headaches, numbness or weakness. He has tried home exercises and muscle relaxant (PT, RFL) for the symptoms. The treatment provided significant (RFL) relief.      PEG Assessment   What number best describes your pain on average in the past week?7  What number best describes how, during the past week, pain has interfered with your enjoyment of life?0  What number best describes how, during the past week, pain has interfered with your general activity?  0    The following portions of the patient's history were reviewed and updated as appropriate: allergies, current medications, past family history, past medical history, past social history, past surgical history, and problem list.    Review of Systems   Constitutional:  Negative for fever.   Cardiovascular:  Negative for chest pain.   Gastrointestinal:  Negative for abdominal pain, constipation and diarrhea.   Genitourinary:  Negative for difficulty urinating and dysuria.   Musculoskeletal:  Positive for back pain.   Neurological:  Negative for weakness, numbness and headaches.   Psychiatric/Behavioral:  Positive for sleep disturbance. Negative for suicidal ideas. The patient is not nervous/anxious.      --  The aforementioned information the Chief Complaint section and above subjective data including any HPI data, and also the Review of Systems data, has been personally reviewed and affirmed.  --     Vitals:    08/26/24 1245   BP: 142/42   Pulse: 77   Resp: 18   Temp: 97.3 °F (36.3 °C)   Weight: 92.4 kg (203 lb 12.8 oz)   Height: 177.8 cm (70\")   PainSc:   6   PainLoc: Back     Objective   Physical Exam  Vitals and nursing note reviewed.   Constitutional:       Appearance: Normal appearance. He is well-developed.   Eyes:      General: Lids are normal.   Cardiovascular:      " Rate and Rhythm: Normal rate.   Pulmonary:      Effort: Pulmonary effort is normal.   Musculoskeletal:      Lumbar back: Spasms, tenderness and bony tenderness present. Decreased range of motion.      Comments:   +Lumbar facet loading   Neurological:      Mental Status: He is alert and oriented to person, place, and time.   Psychiatric:         Attention and Perception: Attention normal.         Mood and Affect: Mood normal.         Speech: Speech normal.         Behavior: Behavior normal.         Judgment: Judgment normal.       Assessment & Plan   Diagnoses and all orders for this visit:    1. Muscle spasm (Primary)  -     cyclobenzaprine (FLEXERIL) 5 MG tablet; Take 1 tablet by mouth 2 (Two) Times a Day As Needed for Muscle Spasms.  Dispense: 180 tablet; Refill: 1    2. Lumbar facet arthropathy    3. Chronic bilateral low back pain without sciatica      Aldair Martinez reports a pain score of 6.  Given his pain assessment as noted, treatment options were discussed and the following options were decided upon as a follow-up plan to address the patient's pain:  Injections .    --- Plan to repeat Bilateral L3-L5 RFA at the 6 month osito as Mr. Martinez's relief consistently wanes at 6 months.   Reviewed the procedure at length with the patient.  Included in the review was expectations, complications, risk and benefits.The procedure was described in detail and the risks, benefits and alternatives were discussed with the patient (including but not limited to: bleeding, infection, nerve damage, worsening of pain, inability to perform injection, paralysis, seizures, coma, no pain relief and death) who agreed to proceed.  Discussed the potential for sedation if warranted/wanted.  The procedure will plan to be performed at [facility] with fluoroscopic guidance(unless ultrasound is indicated) and could potentially have steroids and contrast dye used. Questions were answered and in a way the patient  could understand.  Patient verbalized understanding and wishes to proceed.  This intervention will be ordered.  Discussed with patient that all procedures are part of a multimodal plan of care and include either formal PT or a home exercise program.  Patient has no evidence of coagulopathy or current infection.    --- Refill Flexeril.   --- Follow-up after procedure      Dictated utilizing Dragon dictation.

## 2024-08-27 ENCOUNTER — TRANSCRIBE ORDERS (OUTPATIENT)
Dept: SURGERY | Facility: SURGERY CENTER | Age: 54
End: 2024-08-27
Payer: COMMERCIAL

## 2024-08-27 DIAGNOSIS — Z41.9 SURGERY, ELECTIVE: Primary | ICD-10-CM

## 2024-10-28 ENCOUNTER — TELEPHONE (OUTPATIENT)
Dept: PAIN MEDICINE | Facility: CLINIC | Age: 54
End: 2024-10-28

## 2024-10-28 NOTE — TELEPHONE ENCOUNTER
HUB AGENT ATTEMPTED CLINICAL WARM TRANSFER - NO ANSWER     PLEASE CALL / LEAVE VMAIL PATIENT CELL [phone] TO ADVISE PATIENT / DISCUSS SCHEDULING ANY LUMBAR INJECTIONS IN THE MEANTIME TO HELP MANAGE PAIN     BEFORE PATIENT CAN GET Bilateral L3-L5 radiofrequency ablation RESCHEDULED SINCE WAS DENIED BY [employer] P[employer]     THANKS

## 2024-10-31 ENCOUNTER — APPOINTMENT (OUTPATIENT)
Dept: GENERAL RADIOLOGY | Facility: SURGERY CENTER | Age: 54
End: 2024-10-31
Payer: COMMERCIAL

## 2024-11-01 ENCOUNTER — OFFICE VISIT (OUTPATIENT)
Dept: PAIN MEDICINE | Facility: CLINIC | Age: 54
End: 2024-11-01
Payer: COMMERCIAL

## 2024-11-01 VITALS
DIASTOLIC BLOOD PRESSURE: 77 MMHG | TEMPERATURE: 96.9 F | RESPIRATION RATE: 18 BRPM | BODY MASS INDEX: 29.58 KG/M2 | WEIGHT: 206.6 LBS | OXYGEN SATURATION: 98 % | HEIGHT: 70 IN | SYSTOLIC BLOOD PRESSURE: 141 MMHG | HEART RATE: 70 BPM

## 2024-11-01 DIAGNOSIS — M62.838 MUSCLE SPASM: Primary | ICD-10-CM

## 2024-11-01 DIAGNOSIS — G89.29 CHRONIC BILATERAL LOW BACK PAIN WITHOUT SCIATICA: ICD-10-CM

## 2024-11-01 DIAGNOSIS — M47.816 LUMBAR FACET ARTHROPATHY: ICD-10-CM

## 2024-11-01 DIAGNOSIS — M54.50 CHRONIC BILATERAL LOW BACK PAIN WITHOUT SCIATICA: ICD-10-CM

## 2024-11-01 PROCEDURE — 99214 OFFICE O/P EST MOD 30 MIN: CPT | Performed by: NURSE PRACTITIONER

## 2024-11-01 RX ORDER — CETIRIZINE HYDROCHLORIDE 10 MG/1
1 TABLET ORAL DAILY
COMMUNITY
Start: 2024-09-10 | End: 2024-12-09

## 2024-11-01 RX ORDER — CYCLOBENZAPRINE HCL 10 MG
10 TABLET ORAL 3 TIMES DAILY PRN
Qty: 90 TABLET | Refills: 0 | Status: SHIPPED | OUTPATIENT
Start: 2024-11-01

## 2024-11-01 NOTE — PROGRESS NOTES
CHIEF COMPLAINT  Back   Pt here to discuss RFA.    Subjective   Aldair Martinez is a 53 y.o. male  who presents for follow-up.  He has a history of back pain.  Today his pain is 8/10VAS in severity. His back pain has increased since his last office visit. Unfortunately his Lumbar RFA has been denied. He does report that his 50% relief that lasted the entire 6 months from his last RFA. His pain has not started to increase d/t his work load in his occupation increasing. He continues to utilize flexeril 5 mg daily PRN. He is using these at night and states that these are helpful to his pain.     Previously completed PT for his low back pain which caused worsening pain.      He continues to work full time. 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      Procedure list:  4/25/2024-bilateral L3-L5 RFA-75% relief x 3 months with 50% relief lasting 6 months  10/3/2023-bilateral L3-L5 RFA-80% initial relief with 50% relief lasting 6 months  3/30/2023-bilateral L3-L5 RFA-95% relief, ONGOING 50% relief at this time.   9/29/2022-bilateral L3-L5 RFA-80% relief with 50% relief lasting 6 months. His pain is now returning.   3/29/2022-bilateral L3-L5 RFA-80% relief  8/12/2021-bilateral L3-L5 RFA-65 to 75% relief  12/31/2020-bilateral L3-L5 RFA-60 to 70% relief  11/3/2020-bilateral L3-L5 MBB-80 to 85% diagnostic relief  6/1/2020-bilateral L3-L5 RFA-75 to 80% relief  1/15/2020-bilateral L3-L5 MBB-80% diagnostic relief  12/12/2019-bilateral L3-L5 MBB-80% diagnostic relief    Back Pain  This is a chronic problem. The current episode started more than 1 year ago. The problem occurs daily. Progression since onset: worsened since last office visit. The pain is present in the lumbar spine. The quality of the pain is described as aching and burning. The pain does not radiate. The pain is at a severity of 8/10. The pain is moderate. The symptoms are aggravated by position, sitting and lying down. Pertinent negatives include no abdominal pain,  "chest pain, dysuria, fever, headaches, numbness or weakness. He has tried home exercises and muscle relaxant (PT, RFL) for the symptoms. The treatment provided significant (RFL) relief.      PEG Assessment   What number best describes your pain on average in the past week?8  What number best describes how, during the past week, pain has interfered with your enjoyment of life?8  What number best describes how, during the past week, pain has interfered with your general activity?  8    The following portions of the patient's history were reviewed and updated as appropriate: allergies, current medications, past family history, past medical history, past social history, past surgical history, and problem list.    Review of Systems   Constitutional:  Negative for activity change, fatigue and fever.   Respiratory:  Negative for cough and chest tightness.    Cardiovascular:  Negative for chest pain.   Gastrointestinal:  Negative for abdominal pain, constipation and diarrhea.   Genitourinary:  Negative for dysuria.   Musculoskeletal:  Positive for back pain.   Neurological:  Negative for dizziness, weakness, light-headedness, numbness and headaches.   Psychiatric/Behavioral:  Positive for sleep disturbance. Negative for agitation and suicidal ideas. The patient is not nervous/anxious.      --  The aforementioned information the Chief Complaint section and above subjective data including any HPI data, and also the Review of Systems data, has been personally reviewed and affirmed.  --    Vitals:    11/01/24 0746   BP: 141/77   BP Location: Left arm   Patient Position: Sitting   Cuff Size: Large Adult   Pulse: 70   Resp: 18   Temp: 96.9 °F (36.1 °C)   TempSrc: Temporal   SpO2: 98%   Weight: 93.7 kg (206 lb 9.6 oz)   Height: 177.8 cm (70\")   PainSc:   8     Objective   Physical Exam  Vitals and nursing note reviewed.   Constitutional:       Appearance: Normal appearance. He is well-developed.   Eyes:      General: Lids are " normal.   Cardiovascular:      Rate and Rhythm: Normal rate.   Pulmonary:      Effort: Pulmonary effort is normal.   Musculoskeletal:      Lumbar back: Tenderness and bony tenderness present. Decreased range of motion.      Comments:   +Lumbar facet loading   Neurological:      Mental Status: He is alert and oriented to person, place, and time.   Psychiatric:         Attention and Perception: Attention normal.         Mood and Affect: Mood normal.         Speech: Speech normal.         Behavior: Behavior normal.         Judgment: Judgment normal.       Assessment & Plan   Diagnoses and all orders for this visit:    1. Muscle spasm (Primary)  -     cyclobenzaprine (FLEXERIL) 10 MG tablet; Take 1 tablet by mouth 3 (Three) Times a Day As Needed for Muscle Spasms.  Dispense: 90 tablet; Refill: 0    2. Lumbar facet arthropathy  -     cyclobenzaprine (FLEXERIL) 10 MG tablet; Take 1 tablet by mouth 3 (Three) Times a Day As Needed for Muscle Spasms.  Dispense: 90 tablet; Refill: 0    3. Chronic bilateral low back pain without sciatica      Aldair Martinez reports a pain score of 8.  Given his pain assessment as noted, treatment options were discussed and the following options were decided upon as a follow-up plan to address the patient's pain: prescription for non-opioid analgesics and TENS unit.    --- We are appealing the denial of his repeat lumbar RFA as he has consistently received AT LEAST 50% relief lasting 6 months from each lumbar RFA he has completed in the past, including his most recent lumbar RFA  --- Trial TENS unit, this prescription has been sent to [facility]  --- Increase Flexeril to 10 mg TID PRN  --- Follow-up after procedure     Dictated utilizing Dragon dictation.

## 2024-11-19 ENCOUNTER — TELEPHONE (OUTPATIENT)
Dept: PAIN MEDICINE | Facility: CLINIC | Age: 54
End: 2024-11-19

## 2024-11-19 NOTE — TELEPHONE ENCOUNTER
Caller: Aldair Martinez    Relationship to patient: Self    Best call back number: [phone]*    Patient is needing: PT IS CALLING TO SEE IF BRITTANY COLEY CAN SEND THE INSURANCE MORE INFORMATION STATING THAT THE LOWER LUMBAR ABLATION PROCEDURES HAVE BEEN WORKING SO THAT THEY CAN APPROVE THE NEXT PROCEDURE.. PLEASE ADVISE..

## 2024-11-20 NOTE — TELEPHONE ENCOUNTER
Patient states he received a letter from [employer] denying appeal. He states he has had 75% pain relief, [employer] states they need more documentation from you stating that.

## 2025-01-20 ENCOUNTER — TELEPHONE (OUTPATIENT)
Dept: PAIN MEDICINE | Facility: CLINIC | Age: 55
End: 2025-01-20
Payer: COMMERCIAL

## 2025-01-20 DIAGNOSIS — M62.838 MUSCLE SPASM: Primary | ICD-10-CM

## 2025-01-20 RX ORDER — BACLOFEN 10 MG/1
10 TABLET ORAL 3 TIMES DAILY
Qty: 90 TABLET | Refills: 0 | Status: SHIPPED | OUTPATIENT
Start: 2025-01-20

## 2025-01-20 NOTE — TELEPHONE ENCOUNTER
Caller: Aldair Martinez    Relationship: Self    Best call back number: [phone]    Requested Prescriptions:   HIGHER DOSAGE OF CYCLOBENZAPRINE     Pharmacy where request should be sent: [facility]  [place] [address] - [phone]  - [phone] FX     Last office visit with prescribing clinician: 11/1/2024   Last telemedicine visit with prescribing clinician: Visit date not found   Next office visit with prescribing clinician: Visit date not found     Additional details provided by patient: PT REQUESTING HIGHER FOR THE ABOVE RX. PT STATES HIS CURRENT DOSAGE IS NOT HELPING THE PAIN MUCH ANYMORE. PLEASE CONTACT PT TO DISCUSS NEW RX.       If the office needs to give you a call back, can they leave a voicemail: [x] Yes [] No    Evin Rice   01/20/25 11:11 EST

## 2025-01-20 NOTE — TELEPHONE ENCOUNTER
Continue CPM, pt receiving care from [facility]. We had to move his appt out. Lets trial baclofen 10 mg TID PRN for his muscle spasms. Tell him to stop the flexeril when he starts the baclofen

## 2025-01-20 NOTE — TELEPHONE ENCOUNTER
Can you make an appt for him to be seen. I think we are at the point that we can resubmit for his RFA, we can also consider a different muscle relaxer.

## 2025-02-14 ENCOUNTER — OFFICE VISIT (OUTPATIENT)
Dept: PAIN MEDICINE | Facility: CLINIC | Age: 55
End: 2025-02-14
Payer: COMMERCIAL

## 2025-02-14 ENCOUNTER — TRANSCRIBE ORDERS (OUTPATIENT)
Dept: SURGERY | Facility: SURGERY CENTER | Age: 55
End: 2025-02-14
Payer: COMMERCIAL

## 2025-02-14 VITALS
DIASTOLIC BLOOD PRESSURE: 72 MMHG | HEART RATE: 70 BPM | HEIGHT: 70 IN | BODY MASS INDEX: 30.72 KG/M2 | WEIGHT: 214.6 LBS | RESPIRATION RATE: 18 BRPM | TEMPERATURE: 97.9 F | OXYGEN SATURATION: 98 % | SYSTOLIC BLOOD PRESSURE: 124 MMHG

## 2025-02-14 DIAGNOSIS — M62.838 MUSCLE SPASM: ICD-10-CM

## 2025-02-14 DIAGNOSIS — G89.29 CHRONIC BILATERAL LOW BACK PAIN WITHOUT SCIATICA: ICD-10-CM

## 2025-02-14 DIAGNOSIS — Z41.9 SURGERY, ELECTIVE: Primary | ICD-10-CM

## 2025-02-14 DIAGNOSIS — M47.816 LUMBAR FACET ARTHROPATHY: Primary | ICD-10-CM

## 2025-02-14 DIAGNOSIS — M54.50 CHRONIC BILATERAL LOW BACK PAIN WITHOUT SCIATICA: ICD-10-CM

## 2025-02-14 PROCEDURE — 99214 OFFICE O/P EST MOD 30 MIN: CPT | Performed by: NURSE PRACTITIONER

## 2025-02-14 RX ORDER — BACLOFEN 10 MG/1
10 TABLET ORAL 3 TIMES DAILY
Qty: 270 TABLET | Refills: 0 | Status: SHIPPED | OUTPATIENT
Start: 2025-02-14

## 2025-02-14 RX ORDER — OLMESARTAN MEDOXOMIL 5 MG/1
TABLET ORAL
COMMUNITY
Start: 2024-11-04

## 2025-02-14 NOTE — PROGRESS NOTES
CHIEF COMPLAINT  Follow-up for back pain.    Subjective   Aldair Martinez is a 54 y.o. male  who presents for follow-up.  He has a history of back pain.  Today his pain is 8/10VAS in severity. His primary pain complaint remains his axial low back pain. This pain is worsened bending, twisting, sitting, standing, walking, and laying down. He has consistently gotten 50% pain relief lasting for 6 months from multiple lumbar RFAs. He is currently participating in a HEP previously prescribed by PT.     He is utilizing Baclofen 10 mg TID and does get some relief with this. He denies any side effects including somnolence.     Previously completed PT for his low back pain which caused worsening pain.      He continues to work full time. 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      Procedure list:  4/25/2024-bilateral L3-L5 RFA-75%% relief lasting 6 months  10/3/2023-bilateral L3-L5 RFA-80% initial relief with 50% relief lasting 6 months  3/30/2023-bilateral L3-L5 RFA-95% relief, ONGOING 50% relief at this time.   9/29/2022-bilateral L3-L5 RFA-80% relief with 50% relief lasting 6 months. His pain is now returning.   3/29/2022-bilateral L3-L5 RFA-80% relief  8/12/2021-bilateral L3-L5 RFA-65 to 75% relief  12/31/2020-bilateral L3-L5 RFA-60 to 70% relief  11/3/2020-bilateral L3-L5 MBB-80 to 85% diagnostic relief  6/1/2020-bilateral L3-L5 RFA-75 to 80% relief  1/15/2020-bilateral L3-L5 MBB-80% diagnostic relief  12/12/2019-bilateral L3-L5 MBB-80% diagnostic relief    Back Pain  This is a chronic problem. The current episode started more than 1 year ago. The problem occurs daily. Progression since onset: worsened since last office visit. The pain is present in the lumbar spine. The quality of the pain is described as aching and burning. The pain does not radiate. The pain is at a severity of 8/10. The pain is moderate. The symptoms are aggravated by position, sitting and lying down. Pertinent negatives include no abdominal pain,  "chest pain, dysuria, fever, headaches, numbness or weakness. He has tried home exercises and muscle relaxant (PT, RFL) for the symptoms. The treatment provided significant (RFL) relief.      PEG Assessment   What number best describes your pain on average in the past week?8  What number best describes how, during the past week, pain has interfered with your enjoyment of life?6  What number best describes how, during the past week, pain has interfered with your general activity?  6    The following portions of the patient's history were reviewed and updated as appropriate: allergies, current medications, past family history, past medical history, past social history, past surgical history, and problem list.    Review of Systems   Constitutional:  Negative for fever.   Cardiovascular:  Negative for chest pain.   Gastrointestinal:  Negative for abdominal pain, constipation and diarrhea.   Genitourinary:  Negative for difficulty urinating and dysuria.   Musculoskeletal:  Positive for back pain.   Neurological:  Negative for weakness, numbness and headaches.   Psychiatric/Behavioral:  Positive for sleep disturbance. Negative for suicidal ideas. The patient is not nervous/anxious.      --  The aforementioned information the Chief Complaint section and above subjective data including any HPI data, and also the Review of Systems data, has been personally reviewed and affirmed.  --    Vitals:    02/14/25 0813   BP: 124/72   Pulse: 70   Resp: 18   Temp: 97.9 °F (36.6 °C)   SpO2: 98%   Weight: 97.3 kg (214 lb 9.6 oz)   Height: 177.8 cm (70\")   PainSc:   8   PainLoc: Back     Objective   Physical Exam  Vitals and nursing note reviewed.   Constitutional:       Appearance: Normal appearance. He is well-developed.   Eyes:      General: Lids are normal.   Cardiovascular:      Rate and Rhythm: Normal rate.   Pulmonary:      Effort: Pulmonary effort is normal.   Musculoskeletal:      Lumbar back: Spasms, tenderness and bony " tenderness present. Decreased range of motion. Negative right straight leg raise test and negative left straight leg raise test.      Comments:   +Lumbar facet loading  - Olvin July   Neurological:      Mental Status: He is alert and oriented to person, place, and time.      Motor: No weakness.      Gait: Gait normal.   Psychiatric:         Attention and Perception: Attention normal.         Mood and Affect: Mood normal.         Speech: Speech normal.         Behavior: Behavior normal.         Judgment: Judgment normal.       Assessment & Plan   Diagnoses and all orders for this visit:    1. Lumbar facet arthropathy (Primary)    2. Muscle spasm  -     baclofen (LIORESAL) 10 MG tablet; Take 1 tablet by mouth 3 (Three) Times a Day.  Dispense: 270 tablet; Refill: 0    3. Chronic bilateral low back pain without sciatica      Aldair Martinez reports a pain score of 8.  Given his pain assessment as noted, treatment options were discussed and the following options were decided upon as a follow-up plan to address the patient's pain:  injections and muscle relaxers .    Thermal Radiofrequency Destruction    This repeat thermal radiofrequency destruction (RFA) of cervical, thoracic, or lumbar paravertebral facet joint (medial branch) nerves at the same anatomical site is considered medically reasonable and necessary as this patient has met the criteria of having a minimum of consistent 50% improvement in pain for at least six (6) months or at least 50% consistent improvement in the ability to perform previously painful movements and ADLs as compared to baseline measurement using the same scale.    --- Refill Baclofen 10 mg TID  --- Repeat Bilateral L3-L5 RFA  Reviewed the procedure at length with the patient.  Included in the review was expectations, complications, risk and benefits.The procedure was described in detail and the risks, benefits and alternatives were discussed with the patient (including but not limited to:  bleeding, infection, nerve damage, worsening of pain, inability to perform injection, paralysis, seizures, coma, no pain relief and death) who agreed to proceed.  Discussed the potential for sedation if warranted/wanted.  The procedure will plan to be performed at [facility] with fluoroscopic guidance(unless ultrasound is indicated) and could potentially have steroids and contrast dye used. Questions were answered and in a way the patient could understand.  Patient verbalized understanding and wishes to proceed.  This intervention will be ordered.  Discussed with patient that all procedures are part of a multimodal plan of care and include either formal PT or a home exercise program.  Patient has no evidence of coagulopathy or current infection.    Discussed with the patient that sedation is optional for this procedure.  The sedation offered is called conscious sedation which is different from general anesthesia that is utilized in surgical procedures. The dosing of the sedation is determined by the physician and they will be monitored throughout the procedure. With conscious sedation it is possible to remember parts or all of the procedure, this is normal. They will need to have a  with them as driving is prohibited following conscious sedation.     NPO instructions for conscious sedation:  --- Do not eat 8 hours prior to the procedure.   --- Do not drink any dairy or citrus 4 hours prior to the procedure.   --- Do not drink anything, including clear liquids, 2 hours prior to procedure.     If the NPO instructions are not followed then the procedure may be performed without sedation or the procedure will need to be rescheduled.     --- Follow-up after procedure     Dictated utilizing Dragon dictation.

## 2025-02-14 NOTE — PATIENT INSTRUCTIONS
NPO instructions for conscious sedation:  --- Do not eat 8 hours prior to the procedure.   --- Do not drink any dairy or citrus 4 hours prior to the procedure.   --- Do not drink anything, including clear liquids, 2 hours prior to procedure.

## 2025-02-14 NOTE — H&P (VIEW-ONLY)
CHIEF COMPLAINT  Follow-up for back pain.    Subjective   Aldair Martinez is a 54 y.o. male  who presents for follow-up.  He has a history of back pain.  Today his pain is 8/10VAS in severity. His primary pain complaint remains his axial low back pain. This pain is worsened bending, twisting, sitting, standing, walking, and laying down. He has consistently gotten 50% pain relief lasting for 6 months from multiple lumbar RFAs. He is currently participating in a HEP previously prescribed by PT.     He is utilizing Baclofen 10 mg TID and does get some relief with this. He denies any side effects including somnolence.     Previously completed PT for his low back pain which caused worsening pain.      He continues to work full time. 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      Procedure list:  4/25/2024-bilateral L3-L5 RFA-75%% relief lasting 6 months  10/3/2023-bilateral L3-L5 RFA-80% initial relief with 50% relief lasting 6 months  3/30/2023-bilateral L3-L5 RFA-95% relief, ONGOING 50% relief at this time.   9/29/2022-bilateral L3-L5 RFA-80% relief with 50% relief lasting 6 months. His pain is now returning.   3/29/2022-bilateral L3-L5 RFA-80% relief  8/12/2021-bilateral L3-L5 RFA-65 to 75% relief  12/31/2020-bilateral L3-L5 RFA-60 to 70% relief  11/3/2020-bilateral L3-L5 MBB-80 to 85% diagnostic relief  6/1/2020-bilateral L3-L5 RFA-75 to 80% relief  1/15/2020-bilateral L3-L5 MBB-80% diagnostic relief  12/12/2019-bilateral L3-L5 MBB-80% diagnostic relief    Back Pain  This is a chronic problem. The current episode started more than 1 year ago. The problem occurs daily. Progression since onset: worsened since last office visit. The pain is present in the lumbar spine. The quality of the pain is described as aching and burning. The pain does not radiate. The pain is at a severity of 8/10. The pain is moderate. The symptoms are aggravated by position, sitting and lying down. Pertinent negatives include no abdominal pain,  "chest pain, dysuria, fever, headaches, numbness or weakness. He has tried home exercises and muscle relaxant (PT, RFL) for the symptoms. The treatment provided significant (RFL) relief.      PEG Assessment   What number best describes your pain on average in the past week?8  What number best describes how, during the past week, pain has interfered with your enjoyment of life?6  What number best describes how, during the past week, pain has interfered with your general activity?  6    The following portions of the patient's history were reviewed and updated as appropriate: allergies, current medications, past family history, past medical history, past social history, past surgical history, and problem list.    Review of Systems   Constitutional:  Negative for fever.   Cardiovascular:  Negative for chest pain.   Gastrointestinal:  Negative for abdominal pain, constipation and diarrhea.   Genitourinary:  Negative for difficulty urinating and dysuria.   Musculoskeletal:  Positive for back pain.   Neurological:  Negative for weakness, numbness and headaches.   Psychiatric/Behavioral:  Positive for sleep disturbance. Negative for suicidal ideas. The patient is not nervous/anxious.      --  The aforementioned information the Chief Complaint section and above subjective data including any HPI data, and also the Review of Systems data, has been personally reviewed and affirmed.  --    Vitals:    02/14/25 0813   BP: 124/72   Pulse: 70   Resp: 18   Temp: 97.9 °F (36.6 °C)   SpO2: 98%   Weight: 97.3 kg (214 lb 9.6 oz)   Height: 177.8 cm (70\")   PainSc:   8   PainLoc: Back     Objective   Physical Exam  Vitals and nursing note reviewed.   Constitutional:       Appearance: Normal appearance. He is well-developed.   Eyes:      General: Lids are normal.   Cardiovascular:      Rate and Rhythm: Normal rate.   Pulmonary:      Effort: Pulmonary effort is normal.   Musculoskeletal:      Lumbar back: Spasms, tenderness and bony " tenderness present. Decreased range of motion. Negative right straight leg raise test and negative left straight leg raise test.      Comments:   +Lumbar facet loading  - Olvin July   Neurological:      Mental Status: He is alert and oriented to person, place, and time.      Motor: No weakness.      Gait: Gait normal.   Psychiatric:         Attention and Perception: Attention normal.         Mood and Affect: Mood normal.         Speech: Speech normal.         Behavior: Behavior normal.         Judgment: Judgment normal.       Assessment & Plan   Diagnoses and all orders for this visit:    1. Lumbar facet arthropathy (Primary)    2. Muscle spasm  -     baclofen (LIORESAL) 10 MG tablet; Take 1 tablet by mouth 3 (Three) Times a Day.  Dispense: 270 tablet; Refill: 0    3. Chronic bilateral low back pain without sciatica      Aldair Martinez reports a pain score of 8.  Given his pain assessment as noted, treatment options were discussed and the following options were decided upon as a follow-up plan to address the patient's pain:  injections and muscle relaxers .    Thermal Radiofrequency Destruction    This repeat thermal radiofrequency destruction (RFA) of cervical, thoracic, or lumbar paravertebral facet joint (medial branch) nerves at the same anatomical site is considered medically reasonable and necessary as this patient has met the criteria of having a minimum of consistent 50% improvement in pain for at least six (6) months or at least 50% consistent improvement in the ability to perform previously painful movements and ADLs as compared to baseline measurement using the same scale.    --- Refill Baclofen 10 mg TID  --- Repeat Bilateral L3-L5 RFA  Reviewed the procedure at length with the patient.  Included in the review was expectations, complications, risk and benefits.The procedure was described in detail and the risks, benefits and alternatives were discussed with the patient (including but not limited to:  bleeding, infection, nerve damage, worsening of pain, inability to perform injection, paralysis, seizures, coma, no pain relief and death) who agreed to proceed.  Discussed the potential for sedation if warranted/wanted.  The procedure will plan to be performed at [facility] with fluoroscopic guidance(unless ultrasound is indicated) and could potentially have steroids and contrast dye used. Questions were answered and in a way the patient could understand.  Patient verbalized understanding and wishes to proceed.  This intervention will be ordered.  Discussed with patient that all procedures are part of a multimodal plan of care and include either formal PT or a home exercise program.  Patient has no evidence of coagulopathy or current infection.    Discussed with the patient that sedation is optional for this procedure.  The sedation offered is called conscious sedation which is different from general anesthesia that is utilized in surgical procedures. The dosing of the sedation is determined by the physician and they will be monitored throughout the procedure. With conscious sedation it is possible to remember parts or all of the procedure, this is normal. They will need to have a  with them as driving is prohibited following conscious sedation.     NPO instructions for conscious sedation:  --- Do not eat 8 hours prior to the procedure.   --- Do not drink any dairy or citrus 4 hours prior to the procedure.   --- Do not drink anything, including clear liquids, 2 hours prior to procedure.     If the NPO instructions are not followed then the procedure may be performed without sedation or the procedure will need to be rescheduled.     --- Follow-up after procedure     Dictated utilizing Dragon dictation.

## 2025-03-06 ENCOUNTER — HOSPITAL ENCOUNTER (OUTPATIENT)
Dept: GENERAL RADIOLOGY | Facility: SURGERY CENTER | Age: 55
Setting detail: HOSPITAL OUTPATIENT SURGERY
End: 2025-03-06
Payer: COMMERCIAL

## 2025-03-06 ENCOUNTER — HOSPITAL ENCOUNTER (OUTPATIENT)
Facility: SURGERY CENTER | Age: 55
Setting detail: HOSPITAL OUTPATIENT SURGERY
Discharge: HOME | End: 2025-03-06
Attending: ANESTHESIOLOGY | Admitting: ANESTHESIOLOGY
Payer: COMMERCIAL

## 2025-03-06 VITALS
DIASTOLIC BLOOD PRESSURE: 89 MMHG | WEIGHT: 214 LBS | RESPIRATION RATE: 16 BRPM | BODY MASS INDEX: 30.64 KG/M2 | OXYGEN SATURATION: 97 % | HEART RATE: 78 BPM | TEMPERATURE: 98.7 F | SYSTOLIC BLOOD PRESSURE: 122 MMHG | HEIGHT: 70 IN

## 2025-03-06 DIAGNOSIS — Z41.9 SURGERY, ELECTIVE: ICD-10-CM

## 2025-03-06 LAB — GLUCOSE BLDC GLUCOMTR-MCNC: 114 MG/DL (ref 70–130)

## 2025-03-06 PROCEDURE — 99152 MOD SED SAME PHYS/QHP 5/>YRS: CPT | Performed by: ANESTHESIOLOGY

## 2025-03-06 PROCEDURE — 25010000002 BUPIVACAINE (PF) 0.5 % SOLUTION 10 ML VIAL: Performed by: ANESTHESIOLOGY

## 2025-03-06 PROCEDURE — 76000 FLUOROSCOPY <1 HR PHYS/QHP: CPT

## 2025-03-06 PROCEDURE — 25010000002 LIDOCAINE PF 2% 2 % SOLUTION 5 ML VIAL: Performed by: ANESTHESIOLOGY

## 2025-03-06 PROCEDURE — 25010000002 MIDAZOLAM PER 1MG: Performed by: ANESTHESIOLOGY

## 2025-03-06 PROCEDURE — 64636 DESTROY L/S FACET JNT ADDL: CPT | Performed by: ANESTHESIOLOGY

## 2025-03-06 PROCEDURE — 25010000002 LIDOCAINE PF 1% 1 % SOLUTION 30 ML VIAL: Performed by: ANESTHESIOLOGY

## 2025-03-06 PROCEDURE — 25010000002 FENTANYL CITRATE (PF) 100 MCG/2ML SOLUTION: Performed by: ANESTHESIOLOGY

## 2025-03-06 PROCEDURE — 64635 DESTROY LUMB/SAC FACET JNT: CPT | Performed by: ANESTHESIOLOGY

## 2025-03-06 RX ORDER — FENTANYL CITRATE 50 UG/ML
100 INJECTION, SOLUTION INTRAMUSCULAR; INTRAVENOUS ONCE
Status: COMPLETED | OUTPATIENT
Start: 2025-03-06 | End: 2025-03-06

## 2025-03-06 RX ORDER — MIDAZOLAM HYDROCHLORIDE 2 MG/2ML
6 INJECTION, SOLUTION INTRAMUSCULAR; INTRAVENOUS ONCE
Status: COMPLETED | OUTPATIENT
Start: 2025-03-06 | End: 2025-03-06

## 2025-03-06 RX ADMIN — MIDAZOLAM 6 MG: 1 INJECTION INTRAMUSCULAR; INTRAVENOUS at 09:09

## 2025-03-06 RX ADMIN — FENTANYL CITRATE 100 MCG: 50 INJECTION INTRAMUSCULAR; INTRAVENOUS at 09:10

## 2025-03-06 NOTE — OP NOTE
Bilateral L3-5 Lumbar Medial Branch RADIOFREQUENCY  [facility]      PREOPERATIVE DIAGNOSIS:  Lumbar spondylosis without myelopathy    POSTOPERATIVE DIAGNOSIS:  Lumbar spondylosis without myelopathy    PROCEDURE:   Diagnostic Bilateral Lumbar Medial Branch Nerve thermal radiofrequency lesioning, with fluoroscopy:  L3, L4, and L5 nerves (at the L4 and L5 transverse processes and the sacral alar groove) to thermally treat the innervation to facet joints L4-5 and L5-S1  25099-35 -- Bilateral L/S facet neuro destr., 1st Level  14332-34 -- Bilateral L/S facet neuro destr., 2nd  Level    PRE-PROCEDURE DISCUSSION WITH PATIENT:    Risks and complications were discussed with the patient prior to starting the procedure and informed consent was obtained.      SURGEON:  Onesimo Patino MD    REASON FOR PROCEDURE:    The patient complains of pain that seems to have a significant axial component and Previous clinically significant therapeutic effect after prior Radiofrequency procedure    SEDATION:  Versed 6mg & Fentanyl 100 mcg IV  TIME OF PROCEDURE:   The intraoperative procedure time after administration of the sedative was (9534-4421) 16 minutes.     ANESTHETIC:  Lidocaine 2%  STEROID:  NONE      DESCRIPTON OF PROCEDURE:  After obtaining informed consent, IV access  was obtained in the preoperative area.   The patient was taken to the operating room.  The patient was placed in the prone position with a pillow under the abdomen. All pressure points were well padded.  EKG, blood pressure, and pulse oximeter were monitored.  The patient was monitored and sedated by the RN under my direction. The lumbosacral area was prepped with Chloraprep and draped in a sterile fashion.     Under fluoroscopic guidance the transverse processes of the L4 and L5 vertebrae at the junctions of the superior articular processes were identified on the right.  Also identified was the groove between the ala and the superior articular  process of the sacrum on the ipsilateral side.  Skin and subcutaneous tissue were anesthetized with 1ml of 1% lidocaine above each of these points. Then, radiofrequency probe needles were advanced in this fluoro view to the above junctions.  Aspiration was negative for blood and CSF.  After confirming the position of the needle with fluoroscope in all views, testing was initiated.  First, sensory testing was started on each needle a 1V and 50Hz and slowly decreased until painful pressure stimulation diminished at 0.5V.  Next, motor testing was confirmed to be negative at 3V and 2Hz for any radicular stimulation.  Then 1mL of the local anesthetic was instilled in each needle.  Two minutes elapsed, and during this time a lateral fluoroscopic view was confirmed again to ensure the needles had not advanced nor retracted.  Then, Radiofrequency Lesioning was initiated for 2 minutes at 80 degrees Celsius.  Needles were removed intact from each of the areas.     A similar procedure was repeated to address the L3, L4, and L5 nerves on the contralateral side.   Onset of analgesia was noted.  Vital signs remained stable throughout.      ESTIMATED BLOOD LOSS:  <5 mL  SPECIMENS:  none    COMPLICATIONS:   No complications were noted. and The patient did not have any signs of postprocedure numbness nor weakness.    TOLERANCE & DISCHARGE CONDITION:    The patient tolerated the procedure well.  The patient was transported to the recovery area without difficulties.  The patient was discharged to home under the care of family in stable and satisfactory condition.    PLAN OF CARE:  The patient was given our standard instruction sheet.  The patient will  Return to clinic 6 wks.  The patient will resume all medications as per the medication reconciliation sheet.

## 2025-03-06 NOTE — DISCHARGE INSTRUCTIONS
[facility] - Post-procedure Instructions          --  While there are no absolute restrictions, it is recommended that you do not perform strenuous activity today. In the morning, you may resume your level of activity as before your block.    --  If you have a band-aid at your injection site, please remove it later today. Observe the area for any redness, swelling, pus-like drainage, or a temperature over 101°. If any of these symptoms occur, please call your doctor at [phone]. If after office hours, leave a message and the on-call provider will return your call.    --  Ice may be applied to your injection site. It is recommended you avoid direct heat (heating pad; hot tub) for 1-2 days.    --  Call [facility] at [phone] if you experience persistent headache, persistent bleeding from the injection site, or severe pain not relieved by heat or oral medication.    --  Do not make important decisions today.    --  Due to the effects of the block and/or the I.V. Sedation, DO NOT drive or operate hazardous machinery for 12 hours.  Local anesthetics may cause numbness after procedure and precautions must be taken with regards to operating equipment as well as with walking, even if ambulating with assistance of another person or with an assistive device.    --  Do not drink alcohol for 12 hours.    -- You may return to work tomorrow, or as directed by your referring doctor.    --  Occasionally you may notice a slight increase in your pain after the procedure. This should start to improve within the next 24-48 hours. Radiofrequency ablation procedure pain may last 3-4 weeks.    --  It may take as long as 3-4 days before you notice a gradual improvement in your pain and/or other symptoms.    -- You may continue to take your prescribed pain medication as needed.    --  Some normal possible side effects of steroid use could include fluid retention, increased blood sugar, dull headache,  increased sweating, increased appetite, mood swings and flushing.    --  Diabetics are recommended to watch their blood glucose level closely for 24-48 hours after the injection.    --  Must stay in PACU for 20 min upon arrival and prove no leg weakness before being discharged.    --  IN THE EVENT OF A LIFE THREATENING EMERGENCY, (CHEST PAIN, BREATHING DIFFICULTIES, PARALYSIS…) YOU SHOULD GO TO YOUR NEAREST EMERGENCY ROOM.    --  You should be contacted by our office within 2-3 days to schedule follow up or next appointment date.  If not contacted within 7 days, please call the office at [phone]

## 2025-04-16 ENCOUNTER — OFFICE VISIT (OUTPATIENT)
Dept: PAIN MEDICINE | Facility: CLINIC | Age: 55
End: 2025-04-16
Payer: COMMERCIAL

## 2025-04-16 VITALS
HEART RATE: 68 BPM | SYSTOLIC BLOOD PRESSURE: 112 MMHG | WEIGHT: 214.6 LBS | HEIGHT: 70 IN | OXYGEN SATURATION: 98 % | BODY MASS INDEX: 30.72 KG/M2 | DIASTOLIC BLOOD PRESSURE: 78 MMHG | TEMPERATURE: 95.9 F

## 2025-04-16 DIAGNOSIS — M47.816 LUMBAR FACET ARTHROPATHY: Primary | ICD-10-CM

## 2025-04-16 DIAGNOSIS — M54.50 CHRONIC BILATERAL LOW BACK PAIN WITHOUT SCIATICA: ICD-10-CM

## 2025-04-16 DIAGNOSIS — M62.838 MUSCLE SPASM: ICD-10-CM

## 2025-04-16 DIAGNOSIS — G89.29 CHRONIC BILATERAL LOW BACK PAIN WITHOUT SCIATICA: ICD-10-CM

## 2025-04-16 PROCEDURE — 99213 OFFICE O/P EST LOW 20 MIN: CPT | Performed by: NURSE PRACTITIONER

## 2025-04-16 RX ORDER — BACLOFEN 10 MG/1
10 TABLET ORAL 3 TIMES DAILY
Qty: 270 TABLET | Refills: 0 | Status: SHIPPED | OUTPATIENT
Start: 2025-04-16

## 2025-04-16 NOTE — PROGRESS NOTES
CHIEF COMPLAINT    Back pain    Subjective   Aldair Martinez is a 54 y.o. male  who presents to the office for follow-up of procedure.  He completed a Bilateral L3-L5 radiofrequency ablation  on  3/6/25 performed by Dr. Patino for management of back pain. Patient reports 80% relief from the procedure.     Today his pain is 2/10VAS in severity. He notes that his back pain has improved significantly since his RFA. He continues to utilize baclofen 10 mg TID. He notes significant relief with this. He denies any side effects including somnolence.     Previously completed PT for his low back pain which caused worsening pain. He continues to participate in a HEP.      He continues to work full time. 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      Procedure list:  4/25/2024-bilateral L3-L5 RFA-75%% relief lasting 6 months  10/3/2023-bilateral L3-L5 RFA-80% initial relief with 50% relief lasting 6 months  3/30/2023-bilateral L3-L5 RFA-95% relief, ONGOING 50% relief at this time.   9/29/2022-bilateral L3-L5 RFA-80% relief with 50% relief lasting 6 months. His pain is now returning.   3/29/2022-bilateral L3-L5 RFA-80% relief  8/12/2021-bilateral L3-L5 RFA-65 to 75% relief  12/31/2020-bilateral L3-L5 RFA-60 to 70% relief  11/3/2020-bilateral L3-L5 MBB-80 to 85% diagnostic relief  6/1/2020-bilateral L3-L5 RFA-75 to 80% relief  1/15/2020-bilateral L3-L5 MBB-80% diagnostic relief  12/12/2019-bilateral L3-L5 MBB-80% diagnostic relief    Back Pain  This is a chronic problem. The current episode started more than 1 year ago. The problem occurs daily. The problem has been improved since onset. The pain is present in the lumbar spine. The quality of the pain is described as aching and burning. The pain does not radiate. The pain is at a severity of 2/10. The pain is moderate. The symptoms are aggravated by position, sitting and lying down. Pertinent negatives include no abdominal pain, chest pain, dysuria, fever, headaches,  "numbness or weakness. He has tried home exercises and muscle relaxant (PT, RFL) for the symptoms. The treatment provided significant (RFL) relief.      PEG Assessment   What number best describes your pain on average in the past week?2  What number best describes how, during the past week, pain has interfered with your enjoyment of life?0  What number best describes how, during the past week, pain has interfered with your general activity?  0    The following portions of the patient's history were reviewed and updated as appropriate: allergies, current medications, past family history, past medical history, past social history, past surgical history, and problem list.    Review of Systems   Constitutional:  Negative for chills and fever.   Respiratory:  Negative for cough and shortness of breath.    Cardiovascular:  Negative for chest pain.   Gastrointestinal:  Negative for abdominal pain, constipation and diarrhea.   Genitourinary:  Negative for difficulty urinating and dysuria.   Musculoskeletal:  Positive for back pain.   Neurological:  Negative for dizziness, weakness, light-headedness, numbness and headaches.   Psychiatric/Behavioral:  Negative for agitation.      --  The aforementioned information the Chief Complaint section and above subjective data including any HPI data, and also the Review of Systems data, has been personally reviewed and affirmed.  --     Vitals:    04/16/25 0847   BP: 112/78   BP Location: Left arm   Patient Position: Sitting   Pulse: 68   Temp: 95.9 °F (35.5 °C)   TempSrc: Temporal   SpO2: 98%   Weight: 97.3 kg (214 lb 9.6 oz)   Height: 177.8 cm (70\")   PainSc: 2    PainLoc: Back     Objective   Physical Exam  Vitals and nursing note reviewed.   Constitutional:       Appearance: Normal appearance. He is well-developed.   Eyes:      General: Lids are normal.   Pulmonary:      Effort: Pulmonary effort is normal.   Neurological:      Mental Status: He is alert and oriented to person, " place, and time.   Psychiatric:         Speech: Speech normal.         Behavior: Behavior normal.         Judgment: Judgment normal.       Assessment & Plan   Diagnoses and all orders for this visit:    1. Lumbar facet arthropathy (Primary)    2. Muscle spasm  -     baclofen (LIORESAL) 10 MG tablet; Take 1 tablet by mouth 3 (Three) Times a Day.  Dispense: 270 tablet; Refill: 0    3. Chronic bilateral low back pain without sciatica      Aldair Martinez reports a pain score of 2.  Given his pain assessment as noted, treatment options were discussed and the following options were decided upon as a follow-up plan to address the patient's pain: continuation of current treatment plan for pain.    --- Refill Baclofen 10 mg TID  --- May repeat Lumbar RFA every 6 months PRN  --- Follow-up in approximately 5 months or sooner if needed.      Dictated utilizing Dragon dictation.

## 2025-05-28 NOTE — H&P
Brief Pre-procedural / Pre-operative H&P        -----    Pertinent Diagnosis:   Lumbar facet arthropathy.  Lumbar spondylosis    Proposed Procedure: Lumbar medial branch radiofrequency ablation      Subjective   Aldair Martinez is a 52 y.o. male  who presents for intervention.  He has a history of back pain.      History of Present Illness     Recurrent back pain.  Axial elements.  He has had significant therapeutic relief to multiple radiofrequency ablation procedures.  Recurrence of pain.  With significant therapeutic effect on so many occasions in the recurrence of pain in the repeat radiofrequency ablation is indicated.  There is some low back facet area tenderness noted.    -------    The following portions of the patient's history were reviewed and updated as appropriate: allergies, current medications, past family history, past medical history, past social history, past surgical history and problem list.    Allergies   Allergen Reactions    Penicillins Hives and Swelling     Throat swelling         Current Facility-Administered Medications:     fentaNYL citrate (PF) (SUBLIMAZE) injection 100 mcg, 100 mcg, Intravenous, Once, Onesimo Patino MD    Midazolam HCl (PF) (VERSED) injection 6 mg, 6 mg, Intravenous, Once, Onesimo Patino MD    sodium chloride 0.9 % flush 10 mL, 10 mL, Intravenous, Q12H, Onesimo Patino MD    sodium chloride 0.9 % flush 10 mL, 10 mL, Intravenous, PRN, Onesimo Patino MD    No current facility-administered medications on file prior to encounter.     Current Outpatient Medications on File Prior to Encounter   Medication Sig Dispense Refill    cyclobenzaprine (FLEXERIL) 5 MG tablet Take 1 tablet by mouth 2 (Two) Times a Day As Needed for Muscle Spasms. 60 tablet 1    hydrOXYzine pamoate (VISTARIL) 25 MG capsule Take 1 capsule by mouth Every Night.      lovastatin (MEVACOR) 40 MG tablet Take 1 tablet by mouth.      budesonide-formoterol (SYMBICORT) 80-4.5 MCG/ACT inhaler        cetirizine (zyrTEC) 10 MG tablet Take 1 tablet by mouth Daily.      mometasone (ELOCON) 0.1 % cream          Patient Active Problem List   Diagnosis    Other chronic pain    Lumbar facet arthropathy    Chronic bilateral low back pain without sciatica       Past Medical History:   Diagnosis Date    Carpal tunnel syndrome     Environmental allergies     Hyperlipidemia     Umbilical hernia        Past Surgical History:   Procedure Laterality Date    EYE SURGERY Bilateral     lasix    HAND SURGERY Right     HAND SURGERY Left     RADIOFREQUENCY ABLATION Bilateral 08/12/2021    Procedure: Bilatearl L3-L5 RADIOFREQUENCY ABLATION LUMBAR;  Surgeon: Onesimo Patino MD;  Location: [facility];  Service: Pain Management;  Laterality: Bilateral;    RADIOFREQUENCY ABLATION Bilateral 03/29/2022    Procedure: Bilateral L3-L5 RADIOFREQUENCY ABLATION LUMBAR;  Surgeon: Onesimo Patino MD;  Location: [facility];  Service: Pain Management;  Laterality: Bilateral;    RADIOFREQUENCY ABLATION Bilateral 09/29/2022    Procedure: Bilateral L3-L5 RADIOFREQUENCY ABLATION LUMBAR;  Surgeon: Onesimo Patino MD;  Location: [facility];  Service: Pain Management;  Laterality: Bilateral;    RADIOFREQUENCY ABLATION Bilateral 03/30/2023    Procedure: Bilateral L3-L5 RADIOFREQUENCY ABLATION LUMBAR;  Surgeon: Onesimo Patino MD;  Location: [facility];  Service: Pain Management;  Laterality: Bilateral;    UMBILICAL HERNIA REPAIR         Family History   Problem Relation Age of Onset    No Known Problems Mother     No Known Problems Father        Social History     Socioeconomic History    Marital status:    Tobacco Use    Smoking status: Never    Smokeless tobacco: Never   Vaping Use    Vaping Use: Never used   Substance and Sexual Activity    Alcohol use: No    Drug use: No    Sexual activity: Defer       -------       Review of Systems  No Fever, No Chills, No ear pain, No sinus pressure or drainage, No eye pain or drainage,  "No cough, No SOB, No chest tightness nor chest pain, no palpitations.          Vitals:    10/05/23 0712   BP: 128/81   BP Location: Left arm   Patient Position: Lying   Pulse: 66   Resp: 16   TempSrc: Temporal   SpO2: 95%   Weight: 104 kg (230 lb)   Height: 177.8 cm (70\")         Objective   Physical Exam  VSS, NNR, NCAT, NMNA, NRD, AAOx3.  As above    -------    Assessment & Plan:  - as noted above, the stated intervention is indicated  - Follow-up plan will be noted in the operative report        He should schedule follow-up in 6 weeks      EMR Dragon/Transcription disclaimer:   Typed items in this encounter note may have been created by electronic transcription/translation software which converts spoken language to printed text. The electronic translation of spoken language may permit erroneous, or at times, nonsensical words or phrases to be inadvertently transcribed; Although I have reviewed the note for such errors, some may still exist.     " None

## (undated) DEVICE — Device

## (undated) DEVICE — CANN NASL O2 INF

## (undated) DEVICE — TOWEL,OR,DSP,ST,BLUE,STD,4/PK,20PK/CS: Brand: MEDLINE

## (undated) DEVICE — EPIDURAL TRAY: Brand: MEDLINE INDUSTRIES, INC.

## (undated) DEVICE — PAD GRND E/S NT200IX RF/GEN W/CABL DISP

## (undated) DEVICE — GLV SURG TRIUMPH PF LTX 7.5 STRL

## (undated) DEVICE — CANN ACTIVETIP CRV 18G 10MM 10CM

## (undated) DEVICE — PAD GRND CATHAY W/CABL DISP